# Patient Record
Sex: MALE | Race: WHITE | Employment: FULL TIME | URBAN - METROPOLITAN AREA
[De-identification: names, ages, dates, MRNs, and addresses within clinical notes are randomized per-mention and may not be internally consistent; named-entity substitution may affect disease eponyms.]

---

## 2017-02-14 RX ORDER — BISOPROLOL FUMARATE 5 MG/1
TABLET ORAL
Qty: 30 TABLET | Refills: 0 | Status: SHIPPED | OUTPATIENT
Start: 2017-02-14 | End: 2017-09-11

## 2017-03-11 ENCOUNTER — TELEPHONE (OUTPATIENT)
Dept: INTERNAL MEDICINE CLINIC | Facility: CLINIC | Age: 52
End: 2017-03-11

## 2017-03-11 NOTE — TELEPHONE ENCOUNTER
KIRKI-sent pt to [facility], pt agreeable to plan and to also RF on Tues or call for referral to specialist if recommended (says RF is his new PCP)  Reason for Call/Chief Complaint: rash on inside of eyelid, eye pain  Onset: 2 months  Nursing Assessment/Associated

## 2017-03-13 ENCOUNTER — OFFICE VISIT (OUTPATIENT)
Dept: FAMILY MEDICINE CLINIC | Facility: CLINIC | Age: 52
End: 2017-03-13

## 2017-03-13 VITALS
TEMPERATURE: 98 F | WEIGHT: 230 LBS | SYSTOLIC BLOOD PRESSURE: 128 MMHG | HEIGHT: 72 IN | HEART RATE: 70 BPM | DIASTOLIC BLOOD PRESSURE: 90 MMHG | OXYGEN SATURATION: 98 % | RESPIRATION RATE: 20 BRPM | BODY MASS INDEX: 31.15 KG/M2

## 2017-03-13 DIAGNOSIS — H01.001 BLEPHARITIS OF RIGHT UPPER EYELID, UNSPECIFIED TYPE: Primary | ICD-10-CM

## 2017-03-13 PROCEDURE — 99203 OFFICE O/P NEW LOW 30 MIN: CPT | Performed by: NURSE PRACTITIONER

## 2017-03-13 RX ORDER — ERYTHROMYCIN 5 MG/G
OINTMENT OPHTHALMIC
Qty: 3.5 G | Refills: 0 | Status: SHIPPED | OUTPATIENT
Start: 2017-03-13 | End: 2017-03-27

## 2017-03-13 NOTE — TELEPHONE ENCOUNTER
Please see below. Patient scheduled an appointment on 3/14/17 at 12:00 pm with Dr. Dinah Bryant. Unable to check how patient is doing for his mailbox if full and unable to leave a message.    Ok to wait until tomorrow?:

## 2017-03-13 NOTE — PATIENT INSTRUCTIONS
Blepharitis    Blepharitis is an inflammation of the eyelid. It results in swelling of the eyelids, and it is usually caused by a bacterial infection or a skin condition. Blepharitis is a common eye condition. There are two types.  Anterior blepharitis oc 2. Apply a warm compress or a warm, moist washcloth to the eyelids for 1 minute, 2 to 3 times a day, to loosen the crust. Then, wipe away scales or crust from the eyelids.   3. After applying the warm compress, gently scrub the base of the eyelashes for brad © 1675-2683 [address], [address] [place]. All rights reserved. This information is not intended as a substitute for professional medical care. Always follow your healthcare professional's instructions.         Chen

## 2017-03-13 NOTE — PROGRESS NOTES
Camila Gambino is a 45year old male.   CHIEF COMPLAINT:   Patient presents with:  Eye Problem: corner right eye red and irritated x 2 months        HPI:   Camila Gambino is a 45year old male who presents with a222 2 month history of erythema, irritation fluticasone-salmeterol (ADVAIR DISKUS) 250-50 MCG/DOSE Inhalation Aerosol Powder, Breath Activated Inhale 1 puff into the lungs every 12 (twelve) hours.  Disp: 2 Package Rfl: 6      Past Medical History   Diagnosis Date   • Unspecified essential hypertensio CARDIO: RRR without murmur  LYMPH: no preauricular lymphadenopathy.  No cervical lymphadenopathy    ASSESSMENT AND PLAN:   Sherley Farmer is a 45year old male who presents with Patient presents with:  Eye Problem: corner right eye red and irritated x 2 mo 3. Acne rosacea (a skin condition that causes redness of the face, and other symptoms)  4. Eyelash mites (tiny organisms in the eyelash follicles)  5. Allergic reactions to cosmetics or medicines  Home care  Medicine:  The healthcare provider may prescribe Call your healthcare provider right away if any of these occur:  · Increase in redness of the white part of the eye  · Increase in swelling, redness, irritation, or pain of the eyelids  · Eye pain  · Change in vision (trouble seeing or blurring)  · Drainag xxxxxxxxxxxxxxxxxxxxxxxxxxxxxxxxxxxxxxxxxxxxxxxxxxxxxxxxxxxxxxxxxxxxxxxxxxxxxxxxxxxxxxxxxxxxxxxxxxxxxxxxxxxxxxxxxxxxxxxxxxxxxxxxxxxxx    Follow up with your PCP if there is no improvement in 1 week

## 2017-03-24 ENCOUNTER — TELEPHONE (OUTPATIENT)
Dept: FAMILY MEDICINE CLINIC | Facility: CLINIC | Age: 52
End: 2017-03-24

## 2017-03-24 NOTE — TELEPHONE ENCOUNTER
Pt calling to request refill on erythromycin op oint that he was prescribed 2 weeks ago for blepharitis. I did not see him for this encounter on 3/13 and he is unfamiliar to me. Reports his symptoms have improved but are not completely resolved.  Nothing n

## 2017-03-27 ENCOUNTER — HOSPITAL ENCOUNTER (OUTPATIENT)
Dept: ULTRASOUND IMAGING | Facility: HOSPITAL | Age: 52
Discharge: HOME | End: 2017-03-27
Attending: INTERNAL MEDICINE
Payer: COMMERCIAL

## 2017-03-27 ENCOUNTER — OFFICE VISIT (OUTPATIENT)
Dept: INTERNAL MEDICINE CLINIC | Facility: CLINIC | Age: 52
End: 2017-03-27

## 2017-03-27 VITALS
RESPIRATION RATE: 20 BRPM | SYSTOLIC BLOOD PRESSURE: 138 MMHG | TEMPERATURE: 98 F | BODY MASS INDEX: 31.42 KG/M2 | DIASTOLIC BLOOD PRESSURE: 80 MMHG | HEIGHT: 72 IN | HEART RATE: 72 BPM | WEIGHT: 232 LBS

## 2017-03-27 DIAGNOSIS — Z12.5 SCREENING PSA (PROSTATE SPECIFIC ANTIGEN): ICD-10-CM

## 2017-03-27 DIAGNOSIS — M79.662 PAIN AND SWELLING OF LEFT LOWER LEG: ICD-10-CM

## 2017-03-27 DIAGNOSIS — K92.1 BLOOD IN STOOL: ICD-10-CM

## 2017-03-27 DIAGNOSIS — M79.89 PAIN AND SWELLING OF LEFT LOWER LEG: ICD-10-CM

## 2017-03-27 DIAGNOSIS — H01.9 INFECTED EYE LID: Primary | ICD-10-CM

## 2017-03-27 DIAGNOSIS — Z98.890 HISTORY OF COLONOSCOPY: ICD-10-CM

## 2017-03-27 DIAGNOSIS — Z86.718 HISTORY OF DVT IN ADULTHOOD: ICD-10-CM

## 2017-03-27 DIAGNOSIS — I10 ESSENTIAL HYPERTENSION: ICD-10-CM

## 2017-03-27 DIAGNOSIS — Z12.11 SCREEN FOR COLON CANCER: ICD-10-CM

## 2017-03-27 PROCEDURE — 93971 EXTREMITY STUDY: CPT

## 2017-03-27 PROCEDURE — 99214 OFFICE O/P EST MOD 30 MIN: CPT | Performed by: INTERNAL MEDICINE

## 2017-03-27 PROCEDURE — 99212 OFFICE O/P EST SF 10 MIN: CPT | Performed by: INTERNAL MEDICINE

## 2017-03-27 RX ORDER — ZOLPIDEM TARTRATE 5 MG/1
5 TABLET ORAL NIGHTLY PRN
Refills: 1 | COMMUNITY
Start: 2017-01-10 | End: 2019-08-20

## 2017-03-27 RX ORDER — CLINDAMYCIN HYDROCHLORIDE 300 MG/1
300 CAPSULE ORAL 3 TIMES DAILY
Qty: 30 CAPSULE | Refills: 0 | Status: SHIPPED | OUTPATIENT
Start: 2017-03-27 | End: 2017-03-31

## 2017-03-27 RX ORDER — DEXTROAMPHETAMINE SACCHARATE, AMPHETAMINE ASPARTATE, DEXTROAMPHETAMINE SULFATE AND AMPHETAMINE SULFATE 2.5; 2.5; 2.5; 2.5 MG/1; MG/1; MG/1; MG/1
10 TABLET ORAL DAILY
Refills: 0 | Status: ON HOLD | COMMUNITY
Start: 2017-02-15 | End: 2017-09-04

## 2017-03-27 RX ORDER — SERTRALINE HYDROCHLORIDE 100 MG/1
100 TABLET, FILM COATED ORAL DAILY
Refills: 1 | COMMUNITY
Start: 2017-01-10 | End: 2017-03-27

## 2017-03-27 NOTE — PROGRESS NOTES
HPI:    Patient ID: Julia Menjivar is a 45year old male. Eye Problem   The right eye is affected. This is a recurrent problem. The current episode started in the past 7 days. The problem occurs intermittently. The pain is mild.  Associated symptoms inc HENT:   Head: Normocephalic and atraumatic.    Right Ear: Tympanic membrane, external ear and ear canal normal.   Left Ear: Tympanic membrane, external ear and ear canal normal.   Nose: Nose normal. Right sinus exhibits no maxillary sinus tenderness and no Take high blood pressure medication as perscribed   Low- sodium diet (2grams per day)   Maintain a low fat diet   Maintain ideal weight   Regular walking/exercise as tolerated   Track and record blood pressure at home   The risks and benefits of treatment

## 2017-03-28 ENCOUNTER — TELEPHONE (OUTPATIENT)
Dept: INTERNAL MEDICINE CLINIC | Facility: CLINIC | Age: 52
End: 2017-03-28

## 2017-03-28 ENCOUNTER — TELEPHONE (OUTPATIENT)
Dept: OPHTHALMOLOGY | Facility: CLINIC | Age: 52
End: 2017-03-28

## 2017-03-28 NOTE — TELEPHONE ENCOUNTER
Pt stts he has acid reflux. Pt asking if symptom is an side effect from taking Clindamycin HCl 300 MG Oral Cap ?

## 2017-03-28 NOTE — TELEPHONE ENCOUNTER
Patient  To  Take  Medicine  With  Some food  - stay  Upright  After  Taking  medication  At  Least  For  1  Hr  - do not take  It  Just  Before   Sleep time  .     Can  Take prilosec  OTc   In AM   20 mg 1  Hr  Before  Meal in AM -  And  At   Bed time   -

## 2017-03-28 NOTE — TELEPHONE ENCOUNTER
Received referral from fax Re: eye infection per PCP Dr. Garrett Manley. I called pt but unable to LM due to mailbox being full. Referral scanned in pts chart. OK to book office visit with Ophthalmology if pt calls.

## 2017-03-28 NOTE — TELEPHONE ENCOUNTER
Spoke with patient and relayed doctor message. Verbalized understanding. Advised to call back or go straight to ER if s/sx worsen, questions or concerns. Patient verbalized understanding and agrees with plan.

## 2017-03-31 ENCOUNTER — OFFICE VISIT (OUTPATIENT)
Dept: INTERNAL MEDICINE CLINIC | Facility: CLINIC | Age: 52
End: 2017-03-31

## 2017-03-31 VITALS
BODY MASS INDEX: 31.61 KG/M2 | DIASTOLIC BLOOD PRESSURE: 88 MMHG | HEIGHT: 72 IN | TEMPERATURE: 98 F | SYSTOLIC BLOOD PRESSURE: 133 MMHG | WEIGHT: 233.38 LBS | HEART RATE: 69 BPM

## 2017-03-31 DIAGNOSIS — L20.82 FLEXURAL ECZEMA: Primary | ICD-10-CM

## 2017-03-31 PROCEDURE — 99212 OFFICE O/P EST SF 10 MIN: CPT | Performed by: INTERNAL MEDICINE

## 2017-03-31 PROCEDURE — 99213 OFFICE O/P EST LOW 20 MIN: CPT | Performed by: INTERNAL MEDICINE

## 2017-03-31 RX ORDER — MUPIROCIN CALCIUM 20 MG/G
1 CREAM TOPICAL DAILY
Qty: 15 G | Refills: 1 | Status: SHIPPED | OUTPATIENT
Start: 2017-03-31 | End: 2017-04-14

## 2017-03-31 NOTE — PROGRESS NOTES
Sees multiple docs  Was given clindamycin by dr Kash Reynolds for lesion described in her note  Got diarrhea  Applying neosporin  Blood pressure 133/88, pulse 69, temperature 98 °F (36.7 °C), temperature source Oral, height 6' (1.829 m), weight 233 lb 6.4 oz (105. 8

## 2017-04-21 ENCOUNTER — TELEPHONE (OUTPATIENT)
Dept: INTERNAL MEDICINE CLINIC | Facility: CLINIC | Age: 52
End: 2017-04-21

## 2017-04-21 NOTE — TELEPHONE ENCOUNTER
Advised patient of Dr. Usama Hackett note. Patient verbalized understanding. Appointment made for tomorrow at 10:10am with Dr Marissa Dhillon at [facility].

## 2017-04-21 NOTE — TELEPHONE ENCOUNTER
Ongoing right eye issue since November, eye is red with hazy vision  Referral to ophthalmology is being requesting  Has post nasal drip improving today. Will gargle with salt water and push fluids.   To call back for F/U appt

## 2017-04-21 NOTE — TELEPHONE ENCOUNTER
Please advise patient to be seen as soon as possible below to immediate care or any available Dr. in the clinic tomorrow    Patient was provided with referrals to see the eye doctor on 3/27    -patient likely has infection and needs to be seen  by the doct

## 2017-04-21 NOTE — TELEPHONE ENCOUNTER
Pt states has been having issue with right eye, pt states red, thought it was eye infection seemed like it was going away but then comes back, pt states medication is not helping. Pt also states his glands are swollen, Requesting advise.

## 2017-04-22 ENCOUNTER — OFFICE VISIT (OUTPATIENT)
Dept: INTERNAL MEDICINE CLINIC | Facility: CLINIC | Age: 52
End: 2017-04-22

## 2017-04-22 VITALS
TEMPERATURE: 98 F | HEART RATE: 70 BPM | BODY MASS INDEX: 31 KG/M2 | SYSTOLIC BLOOD PRESSURE: 141 MMHG | WEIGHT: 232 LBS | DIASTOLIC BLOOD PRESSURE: 97 MMHG

## 2017-04-22 DIAGNOSIS — H01.113: Primary | ICD-10-CM

## 2017-04-22 PROCEDURE — 99212 OFFICE O/P EST SF 10 MIN: CPT | Performed by: INTERNAL MEDICINE

## 2017-04-22 PROCEDURE — 99213 OFFICE O/P EST LOW 20 MIN: CPT | Performed by: INTERNAL MEDICINE

## 2017-04-22 RX ORDER — MUPIROCIN CALCIUM 20 MG/G
CREAM TOPICAL
Refills: 1 | COMMUNITY
Start: 2017-04-18 | End: 2017-09-11

## 2017-04-22 NOTE — PROGRESS NOTES
Not improving eye  C/o jaw pain  Blood pressure 141/97, pulse 70, temperature 97.9 °F (36.6 °C), temperature source Oral, weight 232 lb (105.235 kg). r eye crust around eye no stye no cellulitis  Jaw-no nodes enlarged saliva glands  1.  Contact allergy eye

## 2017-05-26 ENCOUNTER — HOSPITAL (OUTPATIENT)
Dept: OTHER | Age: 52
End: 2017-05-26
Attending: EMERGENCY MEDICINE

## 2017-05-31 ENCOUNTER — TELEPHONE (OUTPATIENT)
Dept: INTERNAL MEDICINE CLINIC | Facility: CLINIC | Age: 52
End: 2017-05-31

## 2017-07-05 ENCOUNTER — TELEPHONE (OUTPATIENT)
Dept: INTERNAL MEDICINE CLINIC | Facility: CLINIC | Age: 52
End: 2017-07-05

## 2017-07-05 NOTE — TELEPHONE ENCOUNTER
Pt is due for colonoscopy. I called patient to assist in scheduling gastro consult. Unable to leave voicemail message. Mailbox full.

## 2017-07-26 ENCOUNTER — TELEPHONE (OUTPATIENT)
Dept: INTERNAL MEDICINE CLINIC | Facility: CLINIC | Age: 52
End: 2017-07-26

## 2017-09-04 PROBLEM — F29 PSYCHOSIS (HCC): Status: ACTIVE | Noted: 2017-09-04

## 2017-09-05 PROBLEM — Z86.718 HISTORY OF BLOOD CLOTS: Status: ACTIVE | Noted: 2017-09-05

## 2017-09-05 PROBLEM — E66.9 OBESITY: Status: ACTIVE | Noted: 2017-09-05

## 2017-09-05 PROBLEM — F98.8 ADD (ATTENTION DEFICIT DISORDER): Status: ACTIVE | Noted: 2017-09-05

## 2017-09-05 PROBLEM — E78.1 HIGH TRIGLYCERIDES: Status: ACTIVE | Noted: 2017-09-05

## 2017-09-05 PROBLEM — K21.9 ESOPHAGEAL REFLUX: Status: ACTIVE | Noted: 2017-09-05

## 2017-09-11 PROBLEM — F29 PSYCHOSIS (HCC): Status: RESOLVED | Noted: 2017-09-04 | Resolved: 2017-09-11

## 2017-09-11 PROBLEM — F42.9 OCD (OBSESSIVE COMPULSIVE DISORDER): Status: ACTIVE | Noted: 2017-09-11

## 2017-09-12 ENCOUNTER — TELEPHONE (OUTPATIENT)
Dept: INTERNAL MEDICINE CLINIC | Facility: CLINIC | Age: 52
End: 2017-09-12

## 2017-09-12 RX ORDER — NEBIVOLOL 10 MG/1
10 TABLET ORAL DAILY
Qty: 30 TABLET | Refills: 2 | Status: SHIPPED | OUTPATIENT
Start: 2017-09-12 | End: 2017-09-13

## 2017-09-12 NOTE — TELEPHONE ENCOUNTER
Patient's daughter called. patient needs nebivolol 10mg  And Out of medication. Unable to get response from psychiatrist at [facility], and there depart referred back to PCP. Chart reviewed. Refills sent per Triage Dept protocol.    Daughter advised to PHP/IOP Stepdown from Inpt          Lab Results  Component Value Date   GLU 81 09/05/2017   BUN 18 09/05/2017   CREATSERUM 1.01 09/05/2017   BUNCREA 13.4 08/29/2016   GFRNAA >60 08/29/2016   GFRAA >60 08/29/2016   CA 8.7 09/05/2017   ALKPHOS 44 08/29/2016

## 2017-09-12 NOTE — TELEPHONE ENCOUNTER
[address] [place] [address]       Current Outpatient Prescriptions:  Nebivolol HCl 10 MG Oral Tab Take 1 tablet (10 mg total) by mouth Daily Beta Blocker.  Disp: 30 tablet Rfl: 0   hydrocortisone 2.5 % External Cream Apply 1 Application topically 2 (two) t

## 2017-09-13 ENCOUNTER — TELEPHONE (OUTPATIENT)
Dept: OTHER | Age: 52
End: 2017-09-13

## 2017-09-13 RX ORDER — CARVEDILOL 6.25 MG/1
6.25 TABLET ORAL 2 TIMES DAILY
Qty: 60 TABLET | Refills: 0 | Status: SHIPPED | OUTPATIENT
Start: 2017-09-13 | End: 2018-02-20

## 2017-09-13 RX ORDER — NEBIVOLOL 10 MG/1
10 TABLET ORAL DAILY
Qty: 30 TABLET | Refills: 2 | Status: SHIPPED | OUTPATIENT
Start: 2017-09-13 | End: 2017-09-19

## 2017-09-13 RX ORDER — RISPERIDONE 0.5 MG/1
0.5 TABLET, FILM COATED ORAL 2 TIMES DAILY
Qty: 60 TABLET | Refills: 0 | Status: CANCELLED | OUTPATIENT
Start: 2017-09-13

## 2017-09-13 NOTE — TELEPHONE ENCOUNTER
Pharmacy called in stating pt's medication for Nebivolol is not covered by insurance and is requesting if another medication can be prescribed, please advise.

## 2017-09-13 NOTE — TELEPHONE ENCOUNTER
Pt called office and asking if nebivolol can be escribed to [facility]. Med escribed. Pt states he lost his risperidone bottle he was rx on 9/11/17. Asking for refill. Will seek MD recommendations.     Pt made OV for B/P check 9/19/17

## 2017-09-14 RX ORDER — RISPERIDONE 0.5 MG/1
0.5 TABLET, FILM COATED ORAL 2 TIMES DAILY
Qty: 60 TABLET | Refills: 0 | Status: CANCELLED | OUTPATIENT
Start: 2017-09-14

## 2017-09-14 NOTE — TELEPHONE ENCOUNTER
Spoke with patient and relayed EV message below-he states he will  Coreg and keep appointment 9/19/17 at 3 p.m. Patient asking for refill on Risperdone as he lost bottle he picked up 9/11/17.

## 2017-09-19 ENCOUNTER — OFFICE VISIT (OUTPATIENT)
Dept: INTERNAL MEDICINE CLINIC | Facility: CLINIC | Age: 52
End: 2017-09-19

## 2017-09-19 VITALS
SYSTOLIC BLOOD PRESSURE: 135 MMHG | RESPIRATION RATE: 20 BRPM | HEART RATE: 68 BPM | DIASTOLIC BLOOD PRESSURE: 78 MMHG | TEMPERATURE: 98 F | BODY MASS INDEX: 31.97 KG/M2 | WEIGHT: 236 LBS | HEIGHT: 72 IN

## 2017-09-19 DIAGNOSIS — F32.A ANXIETY AND DEPRESSION: ICD-10-CM

## 2017-09-19 DIAGNOSIS — E78.5 HYPERLIPIDEMIA, UNSPECIFIED HYPERLIPIDEMIA TYPE: ICD-10-CM

## 2017-09-19 DIAGNOSIS — I10 ESSENTIAL HYPERTENSION: Primary | ICD-10-CM

## 2017-09-19 DIAGNOSIS — F41.9 ANXIETY AND DEPRESSION: ICD-10-CM

## 2017-09-19 PROCEDURE — 99212 OFFICE O/P EST SF 10 MIN: CPT | Performed by: INTERNAL MEDICINE

## 2017-09-19 PROCEDURE — 99214 OFFICE O/P EST MOD 30 MIN: CPT | Performed by: INTERNAL MEDICINE

## 2017-09-19 RX ORDER — ZOLPIDEM TARTRATE 5 MG/1
5 TABLET ORAL NIGHTLY PRN
Qty: 30 TABLET | Refills: 0 | Status: CANCELLED | OUTPATIENT
Start: 2017-09-19

## 2017-09-19 NOTE — PROGRESS NOTES
HPI:    Patient ID: Margie Ruiz is a 45year old male. Hypertension   This is a chronic problem. The current episode started more than 1 year ago. The problem has been gradually improving since onset. Associated symptoms include anxiety.  Pertinent n Tartrate 5 MG Oral Tab Take 5 mg by mouth nightly as needed. Disp:  Rfl: 1     Allergies:Not on File   PHYSICAL EXAM:   Physical Exam   Constitutional: He is oriented to person, place, and time. He appears well-developed and well-nourished. No distress. hypertension  (primary encounter diagnosis)  Take high blood pressure medication as perscribed   Low- sodium diet (2grams per day)   Maintain a low fat diet   Maintain ideal weight   Regular walking/exercise as tolerated   Track and record blood pressure a

## 2018-02-12 ENCOUNTER — TELEPHONE (OUTPATIENT)
Dept: INTERNAL MEDICINE CLINIC | Facility: CLINIC | Age: 53
End: 2018-02-12

## 2018-02-12 NOTE — TELEPHONE ENCOUNTER
Patient's brother Harman Macias calling (not on HIPAA, only receiving info from brother) Brother states for approx the last year, he believes the pt's behavior has been becoming increasingly erratic.  Brother states he has seen evidence of the pt having obsessive/co

## 2018-02-12 NOTE — TELEPHONE ENCOUNTER
ADVICE  PATIENT   AND  BROTHER  TO DISCUSS  AND TO  COME TOGETHER  FOR APPOINTMENT WITH PSYCHIATRIST AND MYSELF TO ADDRESS  ALL  THIS  CONCERNS  AND SYMPTOMS

## 2018-02-13 NOTE — TELEPHONE ENCOUNTER
Spoke to pt's father who called back. He is very distressed about the pt's behavior. The pt is not following through with appointments and is not taking meds as prescribed. Informed him that  advises family member attending appts with EV and with Psych.

## 2018-02-13 NOTE — TELEPHONE ENCOUNTER
I called father as he is listed on [employer] and he stated that he will call us back tomorrow.   May release ROUTINE Info to :   Name:  Vik Fu    Relationship:   FATHER   Phone number:  [phone]

## 2018-02-19 ENCOUNTER — TELEPHONE (OUTPATIENT)
Dept: OTHER | Age: 53
End: 2018-02-19

## 2018-02-19 NOTE — TELEPHONE ENCOUNTER
Davis Enrique (father) called to change pts' appt for earlier appt because he works 3p to 11pm and can't miss work. Rescheduled appt 2/20/18 @ 11:00am [place] office with Dr Amie Jensen. Informed father to get PHI signed at office visit.

## 2018-02-20 PROBLEM — F41.8 DEPRESSION WITH ANXIETY: Status: ACTIVE | Noted: 2017-09-19

## 2018-02-20 NOTE — PROGRESS NOTES
HPI:    Patient ID: Julia Menjivar is a 52year old male.     Depression   Visit Type: follow-up (pt  stat e feels  better   now -  seein  in [address] )  Patient presents with the following symptoms: decreased concentration, depressed mood, fe Normocephalic and atraumatic.    Right Ear: Tympanic membrane, external ear and ear canal normal.   Left Ear: Tympanic membrane, external ear and ear canal normal.   Nose: Nose normal. Right sinus exhibits no maxillary sinus tenderness and no frontal sinus Judgment , spending money and  being suspicious  toward  Family  - per  Family   I  Advised pt and   Parents  To see Dr Naif Carlin all his  Behavior that is present  For    Many years  But is getting worse  Lately   Refe

## 2018-02-26 ENCOUNTER — OFFICE VISIT (OUTPATIENT)
Dept: FAMILY MEDICINE CLINIC | Facility: CLINIC | Age: 53
End: 2018-02-26

## 2018-02-26 ENCOUNTER — NURSE TRIAGE (OUTPATIENT)
Dept: INTERNAL MEDICINE CLINIC | Facility: CLINIC | Age: 53
End: 2018-02-26

## 2018-02-26 VITALS
TEMPERATURE: 98 F | SYSTOLIC BLOOD PRESSURE: 147 MMHG | BODY MASS INDEX: 31 KG/M2 | HEART RATE: 82 BPM | DIASTOLIC BLOOD PRESSURE: 103 MMHG | WEIGHT: 230 LBS

## 2018-02-26 DIAGNOSIS — J02.9 SORE THROAT: ICD-10-CM

## 2018-02-26 DIAGNOSIS — H00.014 HORDEOLUM EXTERNUM OF LEFT UPPER EYELID: Primary | ICD-10-CM

## 2018-02-26 DIAGNOSIS — I10 ESSENTIAL HYPERTENSION: ICD-10-CM

## 2018-02-26 LAB
CONTROL LINE PRESENT WITH A CLEAR BACKGROUND (YES/NO): YES YES/NO
KIT LOT #: NORMAL NUMERIC
STREP GRP A CUL-SCR: NEGATIVE

## 2018-02-26 PROCEDURE — 99212 OFFICE O/P EST SF 10 MIN: CPT | Performed by: FAMILY MEDICINE

## 2018-02-26 PROCEDURE — 87880 STREP A ASSAY W/OPTIC: CPT | Performed by: FAMILY MEDICINE

## 2018-02-26 PROCEDURE — 99214 OFFICE O/P EST MOD 30 MIN: CPT | Performed by: FAMILY MEDICINE

## 2018-02-26 RX ORDER — CIPROFLOXACIN HYDROCHLORIDE 3.5 MG/ML
2 SOLUTION/ DROPS TOPICAL
Qty: 5 ML | Refills: 0 | Status: SHIPPED | OUTPATIENT
Start: 2018-02-26 | End: 2018-03-03

## 2018-02-26 RX ORDER — NEBIVOLOL 10 MG/1
10 TABLET ORAL DAILY
Qty: 90 TABLET | Refills: 1 | Status: SHIPPED | OUTPATIENT
Start: 2018-02-26 | End: 2018-03-07

## 2018-02-26 NOTE — PROGRESS NOTES
2018 4:20 PM    Tuan Lazcano, : 1965  Patient presents with:  Eye Pain: left eye lid swelling, itchiness, blurry vision,gritty sensation  Sore Throat    HPI:     Tuan Lazcano is a 52year old male who presents for evaluation of a chief co INTEGUMENTARY:  Negative for rash. ALLERGIC/IMMUNOLOGIC:  Negative for seasonal allergies, frequent URI-type illnesses and history of serious infectious illnesses. NEUROLOGICAL: negative for headaches.     Past Medical History:   Past Medical Histor (!) 147/103 (BP Location: Right arm, Patient Position: Sitting, Cuff Size: adult)   Pulse 82   Temp 98.1 °F (36.7 °C) (Oral)   Wt 230 lb (104.3 kg)   BMI 31.19 kg/m²     GENERAL: well developed, well nourished, well hydrated, no distress  SKIN: good skin t test(s) ordered today ;   Orders Placed This Encounter      POC Rapid Strep [[id number]]     RECOMMENDATIONS given include: Please, call our office with any questions or concerns.  Notify the doctor if there is a deterioration or worsening of the medical conditi

## 2018-02-26 NOTE — TELEPHONE ENCOUNTER
Action Requested: Summary for Provider     []  Critical Lab, Recommendations Needed  [x] Need Additional Advice  []   FYI    []   Need Orders  [] Need Medications Sent to Pharmacy  []  Other     SUMMARY: Dr Vinnie Willis patient reports 2 day onset redness of t

## 2018-02-27 ENCOUNTER — TELEPHONE (OUTPATIENT)
Dept: INTERNAL MEDICINE CLINIC | Facility: CLINIC | Age: 53
End: 2018-02-27

## 2018-02-28 NOTE — TELEPHONE ENCOUNTER
PA for Bystolic 10 mg tab completed with [employer] via CMM response time 3-5 business days KEY [id number].

## 2018-03-05 ENCOUNTER — TELEPHONE (OUTPATIENT)
Dept: INTERNAL MEDICINE CLINIC | Facility: CLINIC | Age: 53
End: 2018-03-05

## 2018-03-05 DIAGNOSIS — F41.9 ANXIETY: ICD-10-CM

## 2018-03-05 DIAGNOSIS — F32.A DEPRESSION, UNSPECIFIED DEPRESSION TYPE: Primary | ICD-10-CM

## 2018-03-05 RX ORDER — CIPROFLOXACIN HYDROCHLORIDE 3.5 MG/ML
SOLUTION/ DROPS TOPICAL
Qty: 5 ML | Refills: 0 | Status: SHIPPED | OUTPATIENT
Start: 2018-03-05 | End: 2018-03-22

## 2018-03-05 NOTE — TELEPHONE ENCOUNTER
Per dad of pt (in [employer]) would like to talk to EV in regards to pt OV with Dr Gopal Box last 03/01/2018, Pt did not want parents to come with him to his appt on that day. Dad stts that pt was in denial., no more info given. Pls advise.

## 2018-03-07 RX ORDER — METOPROLOL TARTRATE 50 MG/1
50 TABLET, FILM COATED ORAL
Qty: 90 TABLET | Refills: 0 | Status: SHIPPED | OUTPATIENT
Start: 2018-03-07 | End: 2018-07-11

## 2018-03-07 NOTE — TELEPHONE ENCOUNTER
PA denied. Plan states patient must have failed two alternative drugs and medical records such as chart notes showing patient has failed alternative drugs must be sent in. Alternative drugs are: atenolol, bisoprolol, and metoprolol.

## 2018-03-19 NOTE — TELEPHONE ENCOUNTER
Called     Started  Taking  Pt   Father  Danelle Adrianubbbas is with   Macy Vela  Now   And line   Got  disconnected .

## 2018-03-19 NOTE — TELEPHONE ENCOUNTER
Discussed with  Pt  And  Father  Advised  To go  For  Visits   Together 0   Dr Ba Rain - Dr Vandana Castano -   Also  f/u  With me    Abs and bp check -   Pt  And  Father   verbalized understanding   And compliance

## 2018-03-22 ENCOUNTER — NURSE TRIAGE (OUTPATIENT)
Dept: INTERNAL MEDICINE CLINIC | Facility: CLINIC | Age: 53
End: 2018-03-22

## 2018-03-22 ENCOUNTER — OFFICE VISIT (OUTPATIENT)
Dept: FAMILY MEDICINE CLINIC | Facility: CLINIC | Age: 53
End: 2018-03-22

## 2018-03-22 VITALS
OXYGEN SATURATION: 98 % | BODY MASS INDEX: 31.56 KG/M2 | TEMPERATURE: 98 F | HEART RATE: 90 BPM | WEIGHT: 233 LBS | RESPIRATION RATE: 16 BRPM | HEIGHT: 72 IN | SYSTOLIC BLOOD PRESSURE: 142 MMHG | DIASTOLIC BLOOD PRESSURE: 108 MMHG

## 2018-03-22 DIAGNOSIS — J20.8 ACUTE VIRAL BRONCHITIS: Primary | ICD-10-CM

## 2018-03-22 DIAGNOSIS — I10 ESSENTIAL HYPERTENSION: ICD-10-CM

## 2018-03-22 PROCEDURE — 99214 OFFICE O/P EST MOD 30 MIN: CPT | Performed by: PHYSICIAN ASSISTANT

## 2018-03-22 RX ORDER — ALBUTEROL SULFATE 90 UG/1
2 AEROSOL, METERED RESPIRATORY (INHALATION) EVERY 6 HOURS
Qty: 1 INHALER | Refills: 1 | Status: SHIPPED | OUTPATIENT
Start: 2018-03-22 | End: 2020-05-08

## 2018-03-22 NOTE — PROGRESS NOTES
CHIEF COMPLAINT:   Patient presents with:  Cough: +tactile fever, productive cough, wheezing?, burning in chest 5 days, slight sore throat        HPI:   Jaylene García is a 52year old male who presents for cough for  5  days.   Cough started gradually and CARDIOVASCULAR: Denies palpitations  LUNGS: Per HPI. GI: Denies N/V/C/D or abdominal pain.       EXAM:   BP (!) 142/108   Pulse 90   Temp 98.2 °F (36.8 °C)   Resp 16   Ht 72\"   Wt 233 lb   SpO2 98%   BMI 31.60 kg/m²   GENERAL: well developed, well nouris The patient is asked to see PCP if sx's persist for more than 2 weeks, sooner if worsen or new onset of fever. Patient Instructions     Viral Bronchitis (Adult)    You have a viral bronchitis.  Bronchitis is inflammation and swelling of the lining of the · Your appetite may be poor, so a light diet is fine. Avoid dehydration by drinking 6 to 8 glasses of fluids per day (such as water, soft drinks, sports drinks, juices, tea, or soup). Extra fluids will help loosen secretions in the nose and lungs.   · Over-

## 2018-03-22 NOTE — TELEPHONE ENCOUNTER
Action Requested: Summary for Provider     []  Critical Lab, Recommendations Needed  [] Need Additional Advice  []   FYI    []   Need Orders  [] Need Medications Sent to Pharmacy  []  Other     SUMMARY: patient directed to [facility] for productive cough,

## 2018-03-23 NOTE — TELEPHONE ENCOUNTER
Was seen in the UC. He needs a follow up appointment for hypertension. Patient did not  his medication given as yet for he gets paid today. He will call back to schedule his follow up appointment for his hypertension.     Now he has sinus conge

## 2018-03-24 NOTE — TELEPHONE ENCOUNTER
Pt was called and an appt was scheduled for Tuesday. Pt would prefer Monday. Dr. María Elena Pike, can pt be added. It was suggested to the pt to call Monday to see if there are cancellation. He voices understanding and agrees with plan.   Advised pt to rest, increase fl

## 2018-03-26 NOTE — TELEPHONE ENCOUNTER
No answer and unable to leave VM since mailbox is full. CSS, if/when pt calls back, please offer appt with EV today instead of the one tomorrow, as per EV's response below.

## 2018-03-27 ENCOUNTER — OFFICE VISIT (OUTPATIENT)
Dept: INTERNAL MEDICINE CLINIC | Facility: CLINIC | Age: 53
End: 2018-03-27

## 2018-03-27 VITALS
DIASTOLIC BLOOD PRESSURE: 97 MMHG | OXYGEN SATURATION: 95 % | RESPIRATION RATE: 18 BRPM | HEART RATE: 76 BPM | BODY MASS INDEX: 32 KG/M2 | SYSTOLIC BLOOD PRESSURE: 154 MMHG | WEIGHT: 236 LBS | TEMPERATURE: 98 F

## 2018-03-27 DIAGNOSIS — I10 ESSENTIAL HYPERTENSION: ICD-10-CM

## 2018-03-27 DIAGNOSIS — E78.5 HYPERLIPIDEMIA, UNSPECIFIED HYPERLIPIDEMIA TYPE: ICD-10-CM

## 2018-03-27 DIAGNOSIS — J01.90 ACUTE NON-RECURRENT SINUSITIS, UNSPECIFIED LOCATION: Primary | ICD-10-CM

## 2018-03-27 PROCEDURE — 99214 OFFICE O/P EST MOD 30 MIN: CPT | Performed by: INTERNAL MEDICINE

## 2018-03-27 PROCEDURE — 99212 OFFICE O/P EST SF 10 MIN: CPT | Performed by: INTERNAL MEDICINE

## 2018-03-27 RX ORDER — AZITHROMYCIN 250 MG/1
TABLET, FILM COATED ORAL
Qty: 6 TABLET | Refills: 0 | Status: SHIPPED | OUTPATIENT
Start: 2018-03-27 | End: 2018-03-31

## 2018-03-28 NOTE — PROGRESS NOTES
HPI:    Patient ID: Valentine Lucero is a 52year old male.   Presents for follow-up on the cough, hypertension    HPI   Patient reports that he has been coughing for about 2 weeks, he was seen at [facility] and prescribed albuterol inhaler, he st by mouth once daily. Disp: 90 tablet Rfl: 0   amphetamine-dextroamphetamine 10 MG Oral Tab Take 10 mg by mouth daily. Disp:  Rfl: 0   Zolpidem Tartrate 5 MG Oral Tab Take 5 mg by mouth nightly as needed.    Disp:  Rfl: 1     Allergies:No Known Allergies with PCP to 3 weeks for blood pressure check    No orders of the defined types were placed in this encounter.       Meds This Visit:  Signed Prescriptions Disp Refills    azithromycin 250 MG Oral Tab 6 tablet 0      Sig: Take 2 tablets by mouth on day #1, c

## 2018-05-18 ENCOUNTER — TELEPHONE (OUTPATIENT)
Dept: CASE MANAGEMENT | Age: 53
End: 2018-05-18

## 2018-05-20 ENCOUNTER — PATIENT OUTREACH (OUTPATIENT)
Dept: CASE MANAGEMENT | Age: 53
End: 2018-05-20

## 2018-05-20 NOTE — PROGRESS NOTES
Name: Toya Jensen       : 1965   Gender: male   Employer Group:   None     Insurance Information:        Medical Group Name: [facility]   Insurance Type: [employer]   Member Telephone:      Telephone Information:   Kadie Woo otherwise. He is still frustrated with work and his coworkers but he has taken a positive step recently and applied for another job. He has not been interviewed yet but he believes it is a better fit than what he is currently doing.  Pt still complains abou

## 2018-06-20 ENCOUNTER — PATIENT OUTREACH (OUTPATIENT)
Dept: CASE MANAGEMENT | Age: 53
End: 2018-06-20

## 2018-06-20 NOTE — PROGRESS NOTES
Patient is due for hypertension follow up appointment. Pt notified and verbalized understanding. 7/10/18 appt scheduled.

## 2018-07-11 ENCOUNTER — OFFICE VISIT (OUTPATIENT)
Dept: INTERNAL MEDICINE CLINIC | Facility: CLINIC | Age: 53
End: 2018-07-11

## 2018-07-11 VITALS
HEART RATE: 76 BPM | HEIGHT: 72 IN | TEMPERATURE: 98 F | BODY MASS INDEX: 31.67 KG/M2 | SYSTOLIC BLOOD PRESSURE: 150 MMHG | WEIGHT: 233.81 LBS | DIASTOLIC BLOOD PRESSURE: 86 MMHG | RESPIRATION RATE: 20 BRPM

## 2018-07-11 DIAGNOSIS — I10 ESSENTIAL HYPERTENSION: Primary | ICD-10-CM

## 2018-07-11 PROCEDURE — 99214 OFFICE O/P EST MOD 30 MIN: CPT | Performed by: INTERNAL MEDICINE

## 2018-07-11 PROCEDURE — 99212 OFFICE O/P EST SF 10 MIN: CPT | Performed by: INTERNAL MEDICINE

## 2018-07-11 RX ORDER — CARVEDILOL 6.25 MG/1
6.25 TABLET ORAL 2 TIMES DAILY WITH MEALS
Qty: 60 TABLET | Refills: 1 | Status: SHIPPED | OUTPATIENT
Start: 2018-07-11 | End: 2019-05-13

## 2018-07-11 RX ORDER — CARVEDILOL 6.25 MG/1
6.25 TABLET ORAL 2 TIMES DAILY WITH MEALS
COMMUNITY
End: 2019-03-04

## 2018-07-11 NOTE — PROGRESS NOTES
HPI:    Patient ID: Guido Alberto is a 52year old male. Patient in office today for follow up  bp check up  visit and exam. States feeling better and working .  Denies chest pain, shortnesss of breath, palpitations, or abdominal pain, denies Uti symptoms Aero Soln Inhale 2 puffs into the lungs every 6 (six) hours. Disp: 1 Inhaler Rfl: 1   amphetamine-dextroamphetamine 10 MG Oral Tab Take 10 mg by mouth daily. Disp:  Rfl: 0   Zolpidem Tartrate 5 MG Oral Tab Take 5 mg by mouth nightly as needed.    Disp:  R Decisions with other people . Nursing note and vitals reviewed. Blood pressure 150/86, pulse 76, temperature 98.4 °F (36.9 °C), temperature source Oral, resp. rate 20, height 6' (1.829 m), weight 233 lb 12.8 oz (106.1 kg).            ASSESSMENT/PLAN:

## 2018-08-27 ENCOUNTER — PATIENT OUTREACH (OUTPATIENT)
Dept: CASE MANAGEMENT | Age: 53
End: 2018-08-27

## 2018-08-27 NOTE — PROGRESS NOTES
Outreach to patient in regards to scheduling his HTN F/U appt. Left message to call back ext. [phone]. Thank you.

## 2018-09-06 ENCOUNTER — TELEPHONE (OUTPATIENT)
Dept: INTERNAL MEDICINE CLINIC | Facility: CLINIC | Age: 53
End: 2018-09-06

## 2018-09-06 NOTE — TELEPHONE ENCOUNTER
Pt called in to have referral for neurology-Alejandra Mckinney mailed to his home. Pt states that he had lost his referral and has not made an appt as of yet. Please call back with confirmation once referral is in the mail.

## 2018-09-12 NOTE — PROGRESS NOTES
Patient due for his HTN f/u visit with his PCP. After multiple attempts to reach patient by phone a letter was sent to patient requesting a call back to discuss.

## 2018-10-11 ENCOUNTER — TELEPHONE (OUTPATIENT)
Dept: INTERNAL MEDICINE CLINIC | Facility: CLINIC | Age: 53
End: 2018-10-11

## 2018-10-11 ENCOUNTER — MOBILE ENCOUNTER (OUTPATIENT)
Dept: INTERNAL MEDICINE CLINIC | Facility: CLINIC | Age: 53
End: 2018-10-11

## 2018-10-11 RX ORDER — AMOXICILLIN 500 MG/1
500 CAPSULE ORAL 3 TIMES DAILY
Qty: 30 CAPSULE | Refills: 0 | Status: SHIPPED | OUTPATIENT
Start: 2018-10-11 | End: 2018-10-21

## 2018-10-11 NOTE — TELEPHONE ENCOUNTER
Message # [id number]         10/11/2018 12:37a   [SERE[facility]]  To:  From:  GILBERT Richard MD:  Phone#:  ----------------------------------------------------------------------   [id number] [id number] [birth year] RE: PAIN IN MOUTH  Paged at number :  PAGE: [phone]

## 2018-12-06 ENCOUNTER — NURSE TRIAGE (OUTPATIENT)
Dept: OTHER | Age: 53
End: 2018-12-06

## 2018-12-06 NOTE — TELEPHONE ENCOUNTER
Action Requested: Summary for Provider     []  Critical Lab, Recommendations Needed  [] Need Additional Advice  [x]   FYI    []   Need Orders  [] Need Medications Sent to Pharmacy  []  Other     SUMMARY: Pt c/o persistent dry cough since Monday 3rd.  Felt w

## 2018-12-19 ENCOUNTER — TELEPHONE (OUTPATIENT)
Dept: INTERNAL MEDICINE CLINIC | Facility: CLINIC | Age: 53
End: 2018-12-19

## 2018-12-20 NOTE — TELEPHONE ENCOUNTER
Pt c/o cough for 2 weeks. He has sputum. Mild SOB. Advised appointment. He wants to be seen on his break from work. Advised he might need to go to urgent care. He didn't want to spend $100 for that.   He would like a regular appointment, but he says h

## 2019-01-07 ENCOUNTER — OFFICE VISIT (OUTPATIENT)
Dept: INTERNAL MEDICINE CLINIC | Facility: CLINIC | Age: 54
End: 2019-01-07

## 2019-01-07 ENCOUNTER — TELEPHONE (OUTPATIENT)
Dept: INTERNAL MEDICINE CLINIC | Facility: CLINIC | Age: 54
End: 2019-01-07

## 2019-01-07 VITALS
HEART RATE: 85 BPM | TEMPERATURE: 98 F | DIASTOLIC BLOOD PRESSURE: 97 MMHG | WEIGHT: 233.38 LBS | BODY MASS INDEX: 32 KG/M2 | SYSTOLIC BLOOD PRESSURE: 142 MMHG

## 2019-01-07 DIAGNOSIS — R53.83 FATIGUE, UNSPECIFIED TYPE: ICD-10-CM

## 2019-01-07 DIAGNOSIS — H53.9 CHANGE IN VISION: ICD-10-CM

## 2019-01-07 DIAGNOSIS — J40 BRONCHITIS: Primary | ICD-10-CM

## 2019-01-07 PROCEDURE — 99214 OFFICE O/P EST MOD 30 MIN: CPT | Performed by: INTERNAL MEDICINE

## 2019-01-07 PROCEDURE — 99212 OFFICE O/P EST SF 10 MIN: CPT | Performed by: INTERNAL MEDICINE

## 2019-01-07 RX ORDER — CODEINE PHOSPHATE AND GUAIFENESIN 10; 100 MG/5ML; MG/5ML
5 SOLUTION ORAL EVERY 6 HOURS PRN
Qty: 240 ML | Refills: 0 | Status: SHIPPED | OUTPATIENT
Start: 2019-01-07 | End: 2019-03-04 | Stop reason: ALTCHOICE

## 2019-01-07 NOTE — TELEPHONE ENCOUNTER
Message # [id number]         2019 01:53p   [[facility]]  To:  From:  GILBERT Richard MD:  Phone#:  ----------------------------------------------------------------------  Sean Young [phone] RE; PT HAS SINUS INFECTION, COUGHING,    61  Paged at n

## 2019-01-07 NOTE — PROGRESS NOTES
HPI:    Patient ID: Kahlil Escobedo is a 52year old male. Patient presents with:  Cough: productivethinks he may be allergic to cats     HPI  Cough  Pt c/o cough with associated burning sensation in chest. This is a new problem: Onset 01/02/2019.  Cough is dysuria. Skin: Negative for color change and pallor. Neurological: Negative for tremors and speech difficulty. Psychiatric/Behavioral: Negative for self-injury and suicidal ideas. The patient is not nervous/anxious.       HISTORY:  Past Medical Histor well-developed and well-nourished. HENT:   Head: Normocephalic and atraumatic.    Right Ear: External ear normal.   Left Ear: External ear normal.   Nose: Nose normal.   Eyes: Conjunctivae, EOM and lids are normal. Pupils are equal, round, and reactive to with associated burning sensation in chest. Pertinent negatives include no CP, chills, eye redness, nor SOB.  Physical examination unremarkable.  -rx guaiFENesin-codeine (CHERATUSSIN AC) 100-10 MG/5ML Oral Solution    (H53.9) Change in vision  Plan: Pt c/o

## 2019-01-08 NOTE — TELEPHONE ENCOUNTER
Page return. Told to do symptomatic treatment. Explained that we will not give antibiotics over the phone until been seen. Offered for him to go to urgent care as well if needed.

## 2019-01-09 ENCOUNTER — NURSE TRIAGE (OUTPATIENT)
Dept: OTHER | Age: 54
End: 2019-01-09

## 2019-01-09 NOTE — TELEPHONE ENCOUNTER
Patient saw Dr Veronika Walker on 1/7/19. Patient stated that still coughing-green phlegm. Short of breath and wheezing for the last 5 days. No audible wheezing or shortness of breath while speaking with me. Thinks has a fever but does not have thermometer.  Did not w

## 2019-01-10 RX ORDER — AZITHROMYCIN 250 MG/1
TABLET, FILM COATED ORAL
Qty: 1 PACKAGE | Refills: 0 | Status: SHIPPED | OUTPATIENT
Start: 2019-01-10 | End: 2019-03-04 | Stop reason: ALTCHOICE

## 2019-01-10 NOTE — TELEPHONE ENCOUNTER
Z-pack sent to patient's pharmacy. Attempted to notify patient, mailbox was full no option to leave a voicemail. My Chart message sent to patient.

## 2019-01-11 NOTE — TELEPHONE ENCOUNTER
[place]   to Vanessa Mendenhall RN           1/10/19 11:16 AM   thank you    Vanessa Mendenhall RN   to [place]           1/10/19 10:30 AM   Dr. Stefania Tatum approved the z-pack for your cough. The z-pack was sent to your pharmacy.  If yo

## 2019-02-14 ENCOUNTER — MOBILE ENCOUNTER (OUTPATIENT)
Dept: INTERNAL MEDICINE CLINIC | Facility: CLINIC | Age: 54
End: 2019-02-14

## 2019-03-01 ENCOUNTER — NURSE TRIAGE (OUTPATIENT)
Dept: OTHER | Age: 54
End: 2019-03-01

## 2019-03-01 NOTE — TELEPHONE ENCOUNTER
Action Requested: Summary for Provider     []  Critical Lab, Recommendations Needed  [x] Need Additional Advice  []   FYI    []   Need Orders  [] Need Medications Sent to Pharmacy  []  Other     SUMMARY: Patient calling with complaint of cough with chest c

## 2019-03-01 NOTE — TELEPHONE ENCOUNTER
Patient should be evaluated by physician today perhaps immediate care visit/ER if emergency    Patient can see me on Monday, March 4 at [address] [facility] office Rosmery approval appointment

## 2019-03-01 NOTE — TELEPHONE ENCOUNTER
Pt should be evaluated in person to determine need for antibiotic. May need chest x-ray to r/o pneumonia but will leave decision to evaluating physician.

## 2019-03-04 ENCOUNTER — OFFICE VISIT (OUTPATIENT)
Dept: INTERNAL MEDICINE CLINIC | Facility: CLINIC | Age: 54
End: 2019-03-04

## 2019-03-04 VITALS
HEIGHT: 72 IN | DIASTOLIC BLOOD PRESSURE: 84 MMHG | SYSTOLIC BLOOD PRESSURE: 156 MMHG | BODY MASS INDEX: 32.53 KG/M2 | WEIGHT: 240.19 LBS | HEART RATE: 74 BPM | TEMPERATURE: 98 F | RESPIRATION RATE: 20 BRPM

## 2019-03-04 DIAGNOSIS — Z12.11 SCREENING FOR MALIGNANT NEOPLASM OF COLON: ICD-10-CM

## 2019-03-04 DIAGNOSIS — I10 ESSENTIAL HYPERTENSION: Primary | ICD-10-CM

## 2019-03-04 DIAGNOSIS — R05.9 COUGH: ICD-10-CM

## 2019-03-04 DIAGNOSIS — Z12.5 SCREENING PSA (PROSTATE SPECIFIC ANTIGEN): ICD-10-CM

## 2019-03-04 PROCEDURE — 99214 OFFICE O/P EST MOD 30 MIN: CPT | Performed by: INTERNAL MEDICINE

## 2019-03-04 PROCEDURE — 99212 OFFICE O/P EST SF 10 MIN: CPT | Performed by: INTERNAL MEDICINE

## 2019-03-04 RX ORDER — FEXOFENADINE HCL 180 MG/1
180 TABLET ORAL DAILY
Qty: 30 TABLET | Refills: 0 | Status: SHIPPED | OUTPATIENT
Start: 2019-03-04 | End: 2019-03-31

## 2019-03-04 RX ORDER — FLUTICASONE PROPIONATE 50 MCG
2 SPRAY, SUSPENSION (ML) NASAL DAILY
Qty: 1 BOTTLE | Refills: 0 | Status: SHIPPED | OUTPATIENT
Start: 2019-03-04 | End: 2019-05-13

## 2019-03-04 RX ORDER — BISOPROLOL FUMARATE 5 MG/1
5 TABLET ORAL DAILY
Qty: 30 TABLET | Refills: 0 | Status: SHIPPED | OUTPATIENT
Start: 2019-03-04 | End: 2019-03-31

## 2019-03-04 RX ORDER — AZITHROMYCIN 250 MG/1
TABLET, FILM COATED ORAL
Qty: 6 TABLET | Refills: 0 | Status: SHIPPED | OUTPATIENT
Start: 2019-03-04 | End: 2019-05-13

## 2019-03-04 NOTE — PROGRESS NOTES
HPI:    Patient ID: Fletcher Piña is a 62year old male. Patient presents with:  Sinus Problem: Pt state that he has sinus problem with coughing and chest congestion    Sinus Problem   This is a new problem. The current episode started in the past 7 days. two puffs in each nostril once daily for 1-2 weeks then as needed Disp: 1 Bottle Rfl: 0   carvedilol 6.25 MG Oral Tab Take 1 tablet (6.25 mg total) by mouth 2 (two) times daily with meals.  Disp: 60 tablet Rfl: 1   Albuterol Sulfate  (90 Base) MCG/AC Musculoskeletal: He exhibits no edema. Lymphadenopathy:     He has no cervical adenopathy. Neurological: He is alert and oriented to person, place, and time. Skin: Skin is warm and dry. Psychiatric: He has a normal mood and affect.  His behavior i once daily for 1-2 weeks and then as needed. • Fluticasone Propionate 50 MCG/ACT Nasal Suspension 1 Bottle 0     Si sprays by Each Nare route daily.  Apply two puffs in each nostril once daily for 1-2 weeks then as needed       Imaging & Referrals:  G

## 2019-03-31 RX ORDER — FEXOFENADINE HCL 180 MG/1
180 TABLET ORAL DAILY
Qty: 30 TABLET | Refills: 0 | Status: SHIPPED | OUTPATIENT
Start: 2019-03-31 | End: 2019-05-13

## 2019-03-31 NOTE — TELEPHONE ENCOUNTER
Please review; protocol failed.   Hypertensive Medications  Protocol Criteria:  · Appointment scheduled in the past 6 months or in the next 3 months  · BMP or CMP in the past 12 months  · Creatinine result < 2  Recent Outpatient Visits            3 weeks ag non-recurrent sinusitis, unspecified location    [facility], [facility], [facility], [facility] MD Frans[place]    Office Visit    1 year ago Acute viral bronchitis    [address] [place] [address]    Office Visit

## 2019-04-05 RX ORDER — BISOPROLOL FUMARATE 5 MG/1
TABLET ORAL
Qty: 30 TABLET | Refills: 0 | Status: SHIPPED | OUTPATIENT
Start: 2019-04-05 | End: 2019-05-19

## 2019-04-05 NOTE — TELEPHONE ENCOUNTER
Dr Lanny Szymanski,    See messages below and advise on pended failed medication.      Please reply to misa: CHANG Smith

## 2019-05-08 NOTE — TELEPHONE ENCOUNTER
Neurology referral approved patient to see Dr. Rufino Barker as he was advised    Also referral to see psychologist and new psychiatrist was placed in the system patient will get a call from our psychology team      If patient does not get a phone call please ad

## 2019-05-08 NOTE — TELEPHONE ENCOUNTER
Pt  Father  Should  Talk  To   Psychiatrist   Regarding mental issue    - Dr Julita Diaz     Also  There is no  HIPPA  On  Our  file .

## 2019-05-08 NOTE — TELEPHONE ENCOUNTER
Father stating patient did not go to neurologist that he was referred to in past but that now patient is more agreeable to going and requests a new referral. Contacted patient and patient agreeable to referral. Patient requested new script for neurology an

## 2019-05-10 NOTE — TELEPHONE ENCOUNTER
No answer and mailbox full, unable to leave message  [employer] message sent asking pt to call office

## 2019-05-13 ENCOUNTER — OFFICE VISIT (OUTPATIENT)
Dept: INTERNAL MEDICINE CLINIC | Facility: CLINIC | Age: 54
End: 2019-05-13

## 2019-05-13 VITALS
HEIGHT: 72 IN | DIASTOLIC BLOOD PRESSURE: 82 MMHG | SYSTOLIC BLOOD PRESSURE: 144 MMHG | BODY MASS INDEX: 32.26 KG/M2 | HEART RATE: 66 BPM | WEIGHT: 238.19 LBS | TEMPERATURE: 98 F

## 2019-05-13 DIAGNOSIS — R00.2 PALPITATION: ICD-10-CM

## 2019-05-13 DIAGNOSIS — R09.81 SINUS CONGESTION: Primary | ICD-10-CM

## 2019-05-13 DIAGNOSIS — I10 ESSENTIAL HYPERTENSION: ICD-10-CM

## 2019-05-13 DIAGNOSIS — J06.9 VIRAL UPPER RESPIRATORY TRACT INFECTION: ICD-10-CM

## 2019-05-13 DIAGNOSIS — R05.9 COUGH: ICD-10-CM

## 2019-05-13 DIAGNOSIS — R53.83 OTHER FATIGUE: ICD-10-CM

## 2019-05-13 DIAGNOSIS — F32.A DEPRESSION, UNSPECIFIED DEPRESSION TYPE: ICD-10-CM

## 2019-05-13 PROCEDURE — 99214 OFFICE O/P EST MOD 30 MIN: CPT | Performed by: INTERNAL MEDICINE

## 2019-05-13 PROCEDURE — 99212 OFFICE O/P EST SF 10 MIN: CPT | Performed by: INTERNAL MEDICINE

## 2019-05-13 RX ORDER — BENZONATATE 100 MG/1
100 CAPSULE ORAL 2 TIMES DAILY PRN
Qty: 10 CAPSULE | Refills: 0 | Status: SHIPPED | OUTPATIENT
Start: 2019-05-13 | End: 2019-08-20

## 2019-05-13 RX ORDER — HYDROCHLOROTHIAZIDE 12.5 MG/1
12.5 TABLET ORAL DAILY
Qty: 90 TABLET | Refills: 3 | Status: SHIPPED | OUTPATIENT
Start: 2019-05-13 | End: 2019-08-20

## 2019-05-13 NOTE — PROGRESS NOTES
Aiyana Whatley is a 62year old male.   Patient presents with:  Sinusitis: sinus pressure, congestion       HPI:   Pt comes as an urgent visit   C/c sinus pressure   C/o sinus pressure and congestion x 3 days   Ros as below   Also noted bp is high       PMH feet  NEURO: denies headaches , anxiety, + depression    EXAM:   /82 (BP Location: Left arm, Patient Position: Sitting, Cuff Size: large)   Pulse 66   Temp 98.4 °F (36.9 °C) (Oral)   Ht 6' (1.829 m)   Wt 238 lb 3.2 oz (108 kg)   BMI 32.31 kg/m²   GEN

## 2019-05-13 NOTE — TELEPHONE ENCOUNTER
Pt also reports started cold symptoms last Saturday, getting worse. Pt coughing up 'colored phlegm' . Denies, HA, SOB, fever. He is not taking any medications for this symptoms but wants to get Zpak. Pt wants to be seen today. Advised Dr Asa Don has no

## 2019-05-13 NOTE — PATIENT INSTRUCTIONS
Viral Upper Respiratory Illness (Adult)    You have a viral upper respiratory illness (URI), which is another term for the common cold. This illness is contagious during the first few days. It is spread through the air by coughing and sneezing.  It may al · Over-the-counter cold medicines will not shorten the length of time you’re sick, but they may be helpful for the following symptoms: cough, sore throat, and nasal and sinus congestion.  If you take prescription medicines, ask your healthcare provider or p

## 2019-05-20 NOTE — TELEPHONE ENCOUNTER
Requested Prescriptions     Pending Prescriptions Disp Refills   • BISOPROLOL FUMARATE 5 MG Oral Tab [Pharmacy Med Name: Bisoprolol Fumarate 5 Mg Tab [facility]] 90 tablet 1     Sig: TAKE ONE TABLET BY MOUTH ONE TIME DAILY         Recent Visits  Date Type Provid

## 2019-05-21 RX ORDER — BISOPROLOL FUMARATE 5 MG/1
TABLET ORAL
Qty: 90 TABLET | Refills: 1 | Status: SHIPPED | OUTPATIENT
Start: 2019-05-21 | End: 2019-08-20

## 2019-06-17 NOTE — TELEPHONE ENCOUNTER
Fax received requesting refill for bystolic 10 mg tablets, 30 quantity, take 1 tablet by mouth daily.   Not on med list.

## 2019-06-18 RX ORDER — NEBIVOLOL 10 MG/1
10 TABLET ORAL DAILY
Qty: 90 TABLET | Refills: 1 | OUTPATIENT
Start: 2019-06-18

## 2019-06-28 RX ORDER — NEBIVOLOL 10 MG/1
10 TABLET ORAL DAILY
Qty: 30 TABLET | Refills: 0 | Status: SHIPPED | OUTPATIENT
Start: 2019-06-28 | End: 2020-10-21 | Stop reason: DRUGHIGH

## 2019-07-01 ENCOUNTER — TELEPHONE (OUTPATIENT)
Dept: INTERNAL MEDICINE CLINIC | Facility: CLINIC | Age: 54
End: 2019-07-01

## 2019-07-01 NOTE — TELEPHONE ENCOUNTER
PA forBystolic 10 mg tab completed with [employer] via CMM response time 3-5 business days KEY [id number].

## 2019-07-01 NOTE — TELEPHONE ENCOUNTER
Fax received requesting ePA. Key [id number]. Current Outpatient Medications:  Nebivolol HCl (BYSTOLIC) 10 MG Oral Tab Take 1 tablet (10 mg total) by mouth daily.  Disp: 30 tablet Rfl: 0

## 2019-07-02 NOTE — TELEPHONE ENCOUNTER
PA approved effective 7/1/2019-7/1/2020.  [place] pharmacist Manny Guaman notified of the approval.

## 2019-07-04 ENCOUNTER — MOBILE ENCOUNTER (OUTPATIENT)
Dept: INTERNAL MEDICINE CLINIC | Facility: CLINIC | Age: 54
End: 2019-07-04

## 2019-07-04 RX ORDER — AZITHROMYCIN 250 MG/1
TABLET, FILM COATED ORAL
Qty: 6 TABLET | Refills: 0 | Status: SHIPPED | OUTPATIENT
Start: 2019-07-04 | End: 2019-08-20

## 2019-07-04 RX ORDER — AZITHROMYCIN 250 MG/1
TABLET, FILM COATED ORAL
Qty: 6 TABLET | Refills: 0 | Status: SHIPPED | OUTPATIENT
Start: 2019-07-04 | End: 2019-07-04

## 2019-07-04 NOTE — PROGRESS NOTES
Received a page about greenish sputum and cough. Treated for viral uri on 5/17. Was given tessalon pearls. Complaints of cough, sputum production and sinus pressure. Sending zpack. Strongly recommended wic/ic .   Patient states he will go on friday if no

## 2019-08-20 ENCOUNTER — OFFICE VISIT (OUTPATIENT)
Dept: INTERNAL MEDICINE CLINIC | Facility: CLINIC | Age: 54
End: 2019-08-20

## 2019-08-20 ENCOUNTER — NURSE TRIAGE (OUTPATIENT)
Dept: OTHER | Age: 54
End: 2019-08-20

## 2019-08-20 VITALS
RESPIRATION RATE: 18 BRPM | HEART RATE: 75 BPM | WEIGHT: 236 LBS | DIASTOLIC BLOOD PRESSURE: 91 MMHG | SYSTOLIC BLOOD PRESSURE: 146 MMHG | BODY MASS INDEX: 32 KG/M2

## 2019-08-20 DIAGNOSIS — Z13.9 SCREENING FOR CONDITION: ICD-10-CM

## 2019-08-20 DIAGNOSIS — M79.662 PAIN OF LEFT CALF: ICD-10-CM

## 2019-08-20 DIAGNOSIS — R25.2 MUSCLE CRAMPING: Primary | ICD-10-CM

## 2019-08-20 DIAGNOSIS — I10 ESSENTIAL HYPERTENSION: ICD-10-CM

## 2019-08-20 PROCEDURE — 99214 OFFICE O/P EST MOD 30 MIN: CPT | Performed by: INTERNAL MEDICINE

## 2019-08-20 NOTE — TELEPHONE ENCOUNTER
Action Requested: Summary for Provider     []  Critical Lab, Recommendations Needed  [] Need Additional Advice  [x]   FYI    []   Need Orders  [] Need Medications Sent to Pharmacy  []  Other     SUMMARY: Patient stated that had a tooth infection in the pas

## 2019-08-20 NOTE — PROGRESS NOTES
HPI:    Patient ID: Perla Ritter is a 62year old male. Presents for evaluation of several concerns. HPI  Patient has history of left leg DVT, treated several years ago.   He states that 2 days ago he was standing on a concert got severe cramp in the l Resp 18   Wt 236 lb (107 kg)   BMI 32.01 kg/m²    Physical Exam    Constitutional: He is oriented to person, place, and time. He appears well-developed and well-nourished. HENT:   Head: Normocephalic and atraumatic.    Right Ear: Tympanic membrane is not Panel [E]      PSA (Screening) [E]      Meds This Visit:  Requested Prescriptions      No prescriptions requested or ordered in this encounter       Imaging & Referrals:  US VENOUS DOPPLER LEG LEFT - DIAG IMG (MTI=[id number])         WZ#[id number]

## 2019-08-23 ENCOUNTER — LAB ENCOUNTER (OUTPATIENT)
Dept: LAB | Facility: HOSPITAL | Age: 54
End: 2019-08-23
Attending: INTERNAL MEDICINE
Payer: COMMERCIAL

## 2019-08-23 DIAGNOSIS — Z12.5 SCREENING PSA (PROSTATE SPECIFIC ANTIGEN): ICD-10-CM

## 2019-08-23 DIAGNOSIS — R53.83 OTHER FATIGUE: ICD-10-CM

## 2019-08-23 DIAGNOSIS — I10 ESSENTIAL HYPERTENSION: ICD-10-CM

## 2019-08-23 DIAGNOSIS — R00.2 PALPITATION: ICD-10-CM

## 2019-08-23 DIAGNOSIS — Z13.9 SCREENING FOR CONDITION: ICD-10-CM

## 2019-08-23 DIAGNOSIS — R25.2 MUSCLE CRAMPING: ICD-10-CM

## 2019-08-23 LAB
ALBUMIN SERPL-MCNC: 4.2 G/DL (ref 3.4–5)
ALBUMIN/GLOB SERPL: 1.1 {RATIO} (ref 1–2)
ALP LIVER SERPL-CCNC: 61 U/L (ref 45–117)
ALT SERPL-CCNC: 45 U/L (ref 16–61)
ANION GAP SERPL CALC-SCNC: 9 MMOL/L (ref 0–18)
AST SERPL-CCNC: 25 U/L (ref 15–37)
BASOPHILS # BLD AUTO: 0.06 X10(3) UL (ref 0–0.2)
BASOPHILS NFR BLD AUTO: 1 %
BILIRUB SERPL-MCNC: 0.6 MG/DL (ref 0.1–2)
BUN BLD-MCNC: 16 MG/DL (ref 7–18)
BUN/CREAT SERPL: 15 (ref 10–20)
CALCIUM BLD-MCNC: 8.7 MG/DL (ref 8.5–10.1)
CHLORIDE SERPL-SCNC: 107 MMOL/L (ref 98–112)
CHOLEST SMN-MCNC: 200 MG/DL (ref ?–200)
CO2 SERPL-SCNC: 26 MMOL/L (ref 21–32)
COMPLEXED PSA SERPL-MCNC: 2.21 NG/ML (ref ?–4)
CREAT BLD-MCNC: 1.07 MG/DL (ref 0.7–1.3)
DEPRECATED RDW RBC AUTO: 43.5 FL (ref 35.1–46.3)
EOSINOPHIL # BLD AUTO: 0.29 X10(3) UL (ref 0–0.7)
EOSINOPHIL NFR BLD AUTO: 4.8 %
ERYTHROCYTE [DISTWIDTH] IN BLOOD BY AUTOMATED COUNT: 12.3 % (ref 11–15)
EST. AVERAGE GLUCOSE BLD GHB EST-MCNC: 105 MG/DL (ref 68–126)
GLOBULIN PLAS-MCNC: 3.9 G/DL (ref 2.8–4.4)
GLUCOSE BLD-MCNC: 77 MG/DL (ref 70–99)
HBA1C MFR BLD HPLC: 5.3 % (ref ?–5.7)
HCT VFR BLD AUTO: 48.2 % (ref 39–53)
HDLC SERPL-MCNC: 32 MG/DL (ref 40–59)
HGB BLD-MCNC: 16.9 G/DL (ref 13–17.5)
IMM GRANULOCYTES # BLD AUTO: 0.01 X10(3) UL (ref 0–1)
IMM GRANULOCYTES NFR BLD: 0.2 %
LDLC SERPL CALC-MCNC: 114 MG/DL (ref ?–100)
LYMPHOCYTES # BLD AUTO: 2.01 X10(3) UL (ref 1–4)
LYMPHOCYTES NFR BLD AUTO: 33.3 %
M PROTEIN MFR SERPL ELPH: 8.1 G/DL (ref 6.4–8.2)
MCH RBC QN AUTO: 33.8 PG (ref 26–34)
MCHC RBC AUTO-ENTMCNC: 35.1 G/DL (ref 31–37)
MCV RBC AUTO: 96.4 FL (ref 80–100)
MONOCYTES # BLD AUTO: 0.58 X10(3) UL (ref 0.1–1)
MONOCYTES NFR BLD AUTO: 9.6 %
NEUTROPHILS # BLD AUTO: 3.09 X10 (3) UL (ref 1.5–7.7)
NEUTROPHILS # BLD AUTO: 3.09 X10(3) UL (ref 1.5–7.7)
NEUTROPHILS NFR BLD AUTO: 51.1 %
NONHDLC SERPL-MCNC: 168 MG/DL (ref ?–130)
OSMOLALITY SERPL CALC.SUM OF ELEC: 294 MOSM/KG (ref 275–295)
PATIENT FASTING: YES
PATIENT FASTING: YES
PLATELET # BLD AUTO: 190 10(3)UL (ref 150–450)
POTASSIUM SERPL-SCNC: 4 MMOL/L (ref 3.5–5.1)
RBC # BLD AUTO: 5 X10(6)UL (ref 4.3–5.7)
SODIUM SERPL-SCNC: 142 MMOL/L (ref 136–145)
TRIGL SERPL-MCNC: 270 MG/DL (ref 30–149)
TSI SER-ACNC: 2.23 MIU/ML (ref 0.36–3.74)
VLDLC SERPL CALC-MCNC: 54 MG/DL (ref 0–30)
WBC # BLD AUTO: 6 X10(3) UL (ref 4–11)

## 2019-08-23 PROCEDURE — 84443 ASSAY THYROID STIM HORMONE: CPT

## 2019-08-23 PROCEDURE — 36415 COLL VENOUS BLD VENIPUNCTURE: CPT

## 2019-08-23 PROCEDURE — 80053 COMPREHEN METABOLIC PANEL: CPT

## 2019-08-23 PROCEDURE — 85025 COMPLETE CBC W/AUTO DIFF WBC: CPT

## 2019-08-23 PROCEDURE — 80061 LIPID PANEL: CPT

## 2019-08-23 PROCEDURE — 83036 HEMOGLOBIN GLYCOSYLATED A1C: CPT

## 2019-08-28 ENCOUNTER — HOSPITAL ENCOUNTER (OUTPATIENT)
Dept: ULTRASOUND IMAGING | Age: 54
Discharge: HOME | End: 2019-08-28
Attending: INTERNAL MEDICINE
Payer: COMMERCIAL

## 2019-08-28 DIAGNOSIS — M79.662 PAIN OF LEFT CALF: ICD-10-CM

## 2019-08-28 PROCEDURE — 93971 EXTREMITY STUDY: CPT | Performed by: INTERNAL MEDICINE

## 2019-09-09 ENCOUNTER — TELEPHONE (OUTPATIENT)
Dept: CARDIOLOGY CLINIC | Facility: CLINIC | Age: 54
End: 2019-09-09

## 2019-09-09 ENCOUNTER — OFFICE VISIT (OUTPATIENT)
Dept: INTERNAL MEDICINE CLINIC | Facility: CLINIC | Age: 54
End: 2019-09-09

## 2019-09-09 VITALS
TEMPERATURE: 98 F | BODY MASS INDEX: 32.05 KG/M2 | WEIGHT: 236.63 LBS | HEART RATE: 82 BPM | HEIGHT: 72 IN | DIASTOLIC BLOOD PRESSURE: 98 MMHG | RESPIRATION RATE: 20 BRPM | SYSTOLIC BLOOD PRESSURE: 150 MMHG

## 2019-09-09 DIAGNOSIS — Z12.12 SCREENING FOR COLORECTAL CANCER: ICD-10-CM

## 2019-09-09 DIAGNOSIS — E78.2 MIXED HYPERLIPIDEMIA: ICD-10-CM

## 2019-09-09 DIAGNOSIS — Z12.11 SCREENING FOR COLORECTAL CANCER: ICD-10-CM

## 2019-09-09 DIAGNOSIS — I10 ESSENTIAL HYPERTENSION: Primary | ICD-10-CM

## 2019-09-09 PROCEDURE — 99214 OFFICE O/P EST MOD 30 MIN: CPT | Performed by: INTERNAL MEDICINE

## 2019-09-09 RX ORDER — NEBIVOLOL 10 MG/1
10 TABLET ORAL DAILY
Qty: 30 TABLET | Refills: 1 | Status: CANCELLED | OUTPATIENT
Start: 2019-09-09

## 2019-09-09 RX ORDER — CLOTRIMAZOLE AND BETAMETHASONE DIPROPIONATE 10; .64 MG/G; MG/G
CREAM TOPICAL
Qty: 60 G | Refills: 0 | Status: SHIPPED | OUTPATIENT
Start: 2019-09-09

## 2019-09-09 RX ORDER — BISOPROLOL FUMARATE 5 MG/1
5 TABLET ORAL EVERY MORNING
Qty: 30 TABLET | Refills: 0 | Status: SHIPPED | OUTPATIENT
Start: 2019-09-09 | End: 2019-10-06

## 2019-09-09 NOTE — PROGRESS NOTES
HPI:    Patient ID: Josue Alvarado is a 62year old male. Patient presents with:  Test Results: Pt is f/u recent labs    bp check     Patient in office today for  BP  Check   here for  Patient states feeling well otherwise.  Denies chest pain, shortnesss of sinus tenderness. Left sinus exhibits no maxillary sinus tenderness and no frontal sinus tenderness. Mouth/Throat: Uvula is midline and oropharynx is clear and moist. No oropharyngeal exudate or posterior oropharyngeal erythema.    Eyes: Right eye exhibit Foot   likely Fungus  Clotrimazole twice daily for 1 week  Side effects and directions of medication discussed with patient. Patient verbalized understanding and compliance. No orders of the defined types were placed in this encounter.       Meds This V

## 2019-09-10 NOTE — TELEPHONE ENCOUNTER
Prior authorization for Bisoprolol Fumarate 5MG oral tab completed w/ [employer] on cover my meds Key:[id number], turn around time 24-72 hrs.

## 2019-09-13 NOTE — TELEPHONE ENCOUNTER
Plan states medication does not require a PA it is covered. Spoke with [facility] pharmacist Michelle Ac and advised. Per Michelle Ac rx was processed and dispensed to patient on 9/9/19 paid for by insurance.

## 2019-10-07 RX ORDER — BISOPROLOL FUMARATE 5 MG/1
5 TABLET ORAL EVERY MORNING
Qty: 90 TABLET | Refills: 0 | Status: SHIPPED | OUTPATIENT
Start: 2019-10-07 | End: 2020-01-15

## 2019-10-08 NOTE — TELEPHONE ENCOUNTER
Refill passed per [facility] protocol.   Hypertensive Medications  Protocol Criteria:  · Appointment scheduled in the past 6 months or in the next 3 months  · BMP or CMP in the past 12 months  · Creatinine result < 2  Recent Outpatient Visits

## 2019-12-13 ENCOUNTER — NURSE TRIAGE (OUTPATIENT)
Dept: INTERNAL MEDICINE CLINIC | Facility: CLINIC | Age: 54
End: 2019-12-13

## 2019-12-13 NOTE — TELEPHONE ENCOUNTER
Patient called in stating that he has not been able to hear in his right ear for about 1 week. Also, states that he has been coughing and having green sputum. He states that he recently had a root canal and was not given antibiotics either.      Lola

## 2019-12-13 NOTE — TELEPHONE ENCOUNTER
Action Requested: Summary for Provider     []  Critical Lab, Recommendations Needed  [] Need Additional Advice  []   FYI    []   Need Orders  [] Need Medications Sent to Pharmacy  []  Other     SUMMARY: Patient requesting to be seen for upper respiratory s

## 2019-12-14 ENCOUNTER — OFFICE VISIT (OUTPATIENT)
Dept: INTERNAL MEDICINE CLINIC | Facility: CLINIC | Age: 54
End: 2019-12-14

## 2019-12-14 VITALS
TEMPERATURE: 98 F | HEIGHT: 72 IN | BODY MASS INDEX: 32.9 KG/M2 | HEART RATE: 62 BPM | SYSTOLIC BLOOD PRESSURE: 150 MMHG | DIASTOLIC BLOOD PRESSURE: 95 MMHG | WEIGHT: 242.88 LBS

## 2019-12-14 DIAGNOSIS — H61.21 CERUMEN DEBRIS ON TYMPANIC MEMBRANE OF RIGHT EAR: ICD-10-CM

## 2019-12-14 DIAGNOSIS — I10 ESSENTIAL HYPERTENSION: ICD-10-CM

## 2019-12-14 DIAGNOSIS — J01.90 ACUTE NON-RECURRENT SINUSITIS, UNSPECIFIED LOCATION: Primary | ICD-10-CM

## 2019-12-14 PROCEDURE — 99213 OFFICE O/P EST LOW 20 MIN: CPT | Performed by: INTERNAL MEDICINE

## 2019-12-14 RX ORDER — AMOXICILLIN AND CLAVULANATE POTASSIUM 875; 125 MG/1; MG/1
1 TABLET, FILM COATED ORAL 2 TIMES DAILY
Qty: 20 TABLET | Refills: 0 | Status: SHIPPED | OUTPATIENT
Start: 2019-12-14 | End: 2019-12-26

## 2019-12-14 NOTE — PATIENT INSTRUCTIONS
Use Debrox ear drops every night for 2-3 weeks, then schedule an appointment for an earwash if you are not are not better.

## 2019-12-14 NOTE — PROGRESS NOTES
HPI:    Patient ID: Nigel Lawrence is a 62year old male. Pt has been sick for a week. Sinus Problem   This is a new problem. The current episode started in the past 7 days. The problem is unchanged. There has been no fever.  Associated symptoms includ .BP (!) 150/95 (BP Location: Right arm, Patient Position: Sitting, Cuff Size: large)   Pulse 62   Temp 97.9 °F (36.6 °C) (Oral)   Ht 6' (1.829 m)   Wt 242 lb 14.4 oz (110.2 kg)   BMI 32.94 kg/m²   PHYSICAL EXAM:   Physical Exam   Nursing note and vit

## 2019-12-24 DIAGNOSIS — J01.90 ACUTE NON-RECURRENT SINUSITIS, UNSPECIFIED LOCATION: ICD-10-CM

## 2019-12-25 ENCOUNTER — TELEPHONE (OUTPATIENT)
Dept: OTHER | Age: 54
End: 2019-12-25

## 2019-12-25 DIAGNOSIS — J01.90 ACUTE NON-RECURRENT SINUSITIS, UNSPECIFIED LOCATION: ICD-10-CM

## 2019-12-25 RX ORDER — AMOXICILLIN AND CLAVULANATE POTASSIUM 875; 125 MG/1; MG/1
TABLET, FILM COATED ORAL
Qty: 20 TABLET | Refills: 0 | OUTPATIENT
Start: 2019-12-25

## 2019-12-25 NOTE — TELEPHONE ENCOUNTER
RN pls call pt and verify the need of rx refill, pls triage or offer ov if needed, thanks. Also [employer] message sent to pt.        Requested Prescriptions     Pending Prescriptions Disp Refills   • Amoxicillin-Pot Clavulanate 875-125 MG Oral Tab 20 tablet

## 2019-12-26 RX ORDER — AMOXICILLIN AND CLAVULANATE POTASSIUM 875; 125 MG/1; MG/1
1 TABLET, FILM COATED ORAL 2 TIMES DAILY
Qty: 16 TABLET | Refills: 0 | Status: SHIPPED | OUTPATIENT
Start: 2019-12-26 | End: 2020-01-03

## 2019-12-26 NOTE — TELEPHONE ENCOUNTER
Mailbox full. Patient prescribed Amoxicillin 12/14 with a 10 day supply. Possible error from pharmacy.

## 2019-12-26 NOTE — TELEPHONE ENCOUNTER
Requested Prescriptions     Signed Prescriptions Disp Refills   • Amoxicillin-Pot Clavulanate 875-125 MG Oral Tab 16 tablet 0     Sig: Take 1 tablet by mouth 2 (two) times daily for 8 days.      Authorizing Provider: Navid Toro

## 2019-12-26 NOTE — TELEPHONE ENCOUNTER
Patient returned call, reports started antibiotic for 2 days and bottle spilled, pills got ruined, requesting if script can be sent for remaining 8 days, reports is still having symptoms would like to complete antibiotic.  Pt was advised insurance may not c

## 2019-12-27 NOTE — TELEPHONE ENCOUNTER
Advised patient's father on VARINDER of the note below. Tried to call patient and his other contacts. All voicemail boxes were full.

## 2020-01-15 ENCOUNTER — TELEPHONE (OUTPATIENT)
Dept: INTERNAL MEDICINE CLINIC | Facility: CLINIC | Age: 55
End: 2020-01-15

## 2020-01-15 ENCOUNTER — HOSPITAL ENCOUNTER (OUTPATIENT)
Age: 55
Discharge: HOME | End: 2020-01-15
Attending: FAMILY MEDICINE
Payer: COMMERCIAL

## 2020-01-15 ENCOUNTER — APPOINTMENT (OUTPATIENT)
Dept: GENERAL RADIOLOGY | Age: 55
End: 2020-01-15
Attending: FAMILY MEDICINE
Payer: COMMERCIAL

## 2020-01-15 VITALS
HEART RATE: 90 BPM | TEMPERATURE: 98 F | DIASTOLIC BLOOD PRESSURE: 92 MMHG | BODY MASS INDEX: 32.51 KG/M2 | SYSTOLIC BLOOD PRESSURE: 180 MMHG | HEIGHT: 72 IN | WEIGHT: 240 LBS | RESPIRATION RATE: 20 BRPM | OXYGEN SATURATION: 96 %

## 2020-01-15 DIAGNOSIS — J32.9 SINUSITIS, UNSPECIFIED CHRONICITY, UNSPECIFIED LOCATION: ICD-10-CM

## 2020-01-15 DIAGNOSIS — I10 UNCONTROLLED HYPERTENSION: ICD-10-CM

## 2020-01-15 DIAGNOSIS — R05.9 COUGH: Primary | ICD-10-CM

## 2020-01-15 PROCEDURE — 99213 OFFICE O/P EST LOW 20 MIN: CPT

## 2020-01-15 PROCEDURE — 99204 OFFICE O/P NEW MOD 45 MIN: CPT

## 2020-01-15 PROCEDURE — 71046 X-RAY EXAM CHEST 2 VIEWS: CPT | Performed by: FAMILY MEDICINE

## 2020-01-15 RX ORDER — AZITHROMYCIN 250 MG/1
TABLET, FILM COATED ORAL
Qty: 1 PACKAGE | Refills: 0 | Status: SHIPPED | OUTPATIENT
Start: 2020-01-15 | End: 2020-01-20

## 2020-01-15 RX ORDER — ALBUTEROL SULFATE 90 UG/1
2 AEROSOL, METERED RESPIRATORY (INHALATION) EVERY 4 HOURS PRN
Qty: 1 INHALER | Refills: 0 | Status: SHIPPED | OUTPATIENT
Start: 2020-01-15 | End: 2020-02-06

## 2020-01-15 RX ORDER — BISOPROLOL FUMARATE 5 MG/1
5 TABLET ORAL EVERY MORNING
Qty: 90 TABLET | Refills: 0 | Status: SHIPPED | OUTPATIENT
Start: 2020-01-15 | End: 2020-03-25

## 2020-01-15 NOTE — TELEPHONE ENCOUNTER
Patient states he is out of Bisoprolol Fumarate 5 mg. Refilling per protocol.   Hypertensive Medications  Protocol Criteria:  · Appointment scheduled in the past 6 months or in the next 3 months  · BMP or CMP in the past 12 months  · Creatinine result < 2

## 2020-01-15 NOTE — TELEPHONE ENCOUNTER
Patient states he has a headache, cough and fever (no thermometer) now on second day. States symptoms similar to when seen by Dr. Mona Merchant on 12/14 for sinusitis and cough.     Patient requesting another refill for Amoxicillin as \"this is what worked last

## 2020-01-16 NOTE — TELEPHONE ENCOUNTER
Spoke with patient ( verified) and relayed Dr. Tigist Horta message below--patient verbalizes understanding and agreement. Contacted Lory Lefort, [facility] to assist with UC visit in [place] per patient request. No further questions/concerns at this time.

## 2020-01-16 NOTE — ED INITIAL ASSESSMENT (HPI)
Pt c/o headache since waking up 3-4/10 after taking Aspirin 325 mg, pt states he has been feeling warm,but has not checked temp at home. Pt also c/o cough over the past month, and finished Amoxicillin this past Friday.  Pt stopped taking BP meds over the pa

## 2020-01-16 NOTE — ED PROVIDER NOTES
Patient Seen in: [facility]      History   Patient presents with:  Headache  Cough/URI    Stated Complaint: headaches and cough x 7 days     HPI    49-year-old male with previous history of hypertension presents with compla HPI.  Constitutional and vital signs reviewed. All other systems reviewed and negative except as noted above.     Physical Exam     ED Triage Vitals [01/15/20 1948]   BP (!) 180/92   Pulse 90   Resp 20   Temp 98.1 °F (36.7 °C)   Temp src Temporal   SpO atelectasis/scarring in the lower lungs.     Dictated by: Goldie Delvalle MD on 1/15/2020 at 20:17     Approved by: Goldie Delvalle MD on 1/15/2020 at 20:17                MDM       Patient with sinus symptoms for the past 2 weeks now with a cough for the p

## 2020-01-30 RX ORDER — BISOPROLOL FUMARATE 5 MG/1
TABLET, FILM COATED ORAL
Qty: 90 TABLET | Refills: 0 | OUTPATIENT
Start: 2020-01-30

## 2020-02-04 ENCOUNTER — TELEPHONE (OUTPATIENT)
Dept: INTERNAL MEDICINE CLINIC | Facility: CLINIC | Age: 55
End: 2020-02-04

## 2020-02-04 RX ORDER — FEXOFENADINE HCL 180 MG/1
180 TABLET ORAL DAILY
Qty: 30 TABLET | Refills: 0 | Status: SHIPPED | OUTPATIENT
Start: 2020-02-04 | End: 2020-02-06

## 2020-02-04 NOTE — TELEPHONE ENCOUNTER
Patient calling with update, went to  on 1/15/2019 dx with Bronchitis;  finished Z pack, started to \" fell better'   Feels  like \"it went away and now returned\"  Remains with dry cough, denies fever, runny nose, sore throat     Believes he may be sujey

## 2020-02-04 NOTE — TELEPHONE ENCOUNTER
Patient should be seen in office for f/u visit    can try meantime Allegra 180 mg   otc  plain--I will send prescription to the pharmacy  for the allergies daily    Patient to schedule appointment soon

## 2020-02-05 NOTE — TELEPHONE ENCOUNTER
Pt calling back and MD message given. Pt verb understanding and agrees to plan. OV scheduled 2/6/20 with Dr Kwasi Contreras. Pt states he has allergra at home and will use that.

## 2020-02-06 ENCOUNTER — OFFICE VISIT (OUTPATIENT)
Dept: INTERNAL MEDICINE CLINIC | Facility: CLINIC | Age: 55
End: 2020-02-06

## 2020-02-06 VITALS
HEART RATE: 80 BPM | BODY MASS INDEX: 33.13 KG/M2 | OXYGEN SATURATION: 94 % | RESPIRATION RATE: 20 BRPM | DIASTOLIC BLOOD PRESSURE: 80 MMHG | HEIGHT: 72 IN | WEIGHT: 244.63 LBS | TEMPERATURE: 98 F | SYSTOLIC BLOOD PRESSURE: 138 MMHG

## 2020-02-06 DIAGNOSIS — K21.9 GASTROESOPHAGEAL REFLUX DISEASE WITHOUT ESOPHAGITIS: ICD-10-CM

## 2020-02-06 DIAGNOSIS — I10 ESSENTIAL HYPERTENSION: ICD-10-CM

## 2020-02-06 DIAGNOSIS — J30.9 ALLERGIC RHINITIS, UNSPECIFIED SEASONALITY, UNSPECIFIED TRIGGER: ICD-10-CM

## 2020-02-06 DIAGNOSIS — Z12.11 SCREENING FOR COLON CANCER: ICD-10-CM

## 2020-02-06 DIAGNOSIS — R05.9 COUGH: Primary | ICD-10-CM

## 2020-02-06 PROCEDURE — 99214 OFFICE O/P EST MOD 30 MIN: CPT | Performed by: INTERNAL MEDICINE

## 2020-02-06 RX ORDER — OMEPRAZOLE 40 MG/1
40 CAPSULE, DELAYED RELEASE ORAL DAILY
Qty: 90 CAPSULE | Refills: 1 | Status: SHIPPED | OUTPATIENT
Start: 2020-02-06 | End: 2020-03-07

## 2020-02-06 RX ORDER — AZITHROMYCIN 250 MG/1
TABLET, FILM COATED ORAL
Qty: 6 TABLET | Refills: 0 | Status: SHIPPED | OUTPATIENT
Start: 2020-02-06 | End: 2020-10-21 | Stop reason: ALTCHOICE

## 2020-02-06 RX ORDER — AMOXICILLIN 875 MG/1
500 TABLET, COATED ORAL 3 TIMES DAILY
Refills: 0 | COMMUNITY
Start: 2019-09-10 | End: 2020-10-21 | Stop reason: ALTCHOICE

## 2020-02-06 RX ORDER — FEXOFENADINE HCL 180 MG/1
180 TABLET ORAL DAILY
Qty: 30 TABLET | Refills: 1 | Status: SHIPPED | OUTPATIENT
Start: 2020-02-06 | End: 2020-04-23

## 2020-02-06 NOTE — PROGRESS NOTES
HPI:    Patient ID: Trae Spicer is a 60year old male.   Patient presents with:  Cough: Pt is f/u with cough and that he has chest pain from coughing and facial pain   Per pt  had a z pack 1  Week  Ago  For bad cough per pt   felt better , But  cough came diarrhea, nausea and vomiting. Heartburn   Genitourinary: Negative for dysuria and frequency. Musculoskeletal: Negative for myalgias. Skin: Negative for pallor and rash. Neurological: Negative for dizziness and headaches.    Psychiatric/Behavio Uvula is midline, oropharynx is clear and moist and mucous membranes are normal. No oropharyngeal exudate, posterior oropharyngeal edema or posterior oropharyngeal erythema.   + Postnasal drainage    Eyes: Pupils are equal, round, and reactive to light.  Co heart rate at home   The side effects of medication discussed with patient   Patient verbalizes understanding and compliance  Bisoprolol 5 mg qd   Stable  Cpm    Gastroesophageal reflux disease without esophagitis  Patient advised to avoid spicy food, coff

## 2020-02-11 ENCOUNTER — TELEPHONE (OUTPATIENT)
Dept: INTERNAL MEDICINE CLINIC | Facility: CLINIC | Age: 55
End: 2020-02-11

## 2020-02-11 NOTE — TELEPHONE ENCOUNTER
Patient had 2 courses of antibiotics so far Charlie   he needs to see me in office for follow-up visit on the cough    Recommend patient to take Allegra 180 mg daily-   patient see me in office within 1 week

## 2020-02-12 NOTE — TELEPHONE ENCOUNTER
Advised patient of Dr. Marcy Alford note. Patient verbalized understanding. Follow up appointment made for Tuesday 2/18/20 at 2pm at [facility] in [address] [place] [address].

## 2020-02-12 NOTE — TELEPHONE ENCOUNTER
Attempted to reach patient. Patient unavailable. Voicemail box full. Patient does not actively use My Chart. Will attempt to reach patient at a later time.

## 2020-03-25 RX ORDER — BISOPROLOL FUMARATE 5 MG/1
5 TABLET ORAL EVERY MORNING
Qty: 90 TABLET | Refills: 0 | Status: SHIPPED | OUTPATIENT
Start: 2020-03-25 | End: 2020-05-02

## 2020-04-23 ENCOUNTER — NURSE TRIAGE (OUTPATIENT)
Dept: INTERNAL MEDICINE CLINIC | Facility: CLINIC | Age: 55
End: 2020-04-23

## 2020-04-23 DIAGNOSIS — J30.89 ALLERGIC RHINITIS DUE TO OTHER ALLERGIC TRIGGER, UNSPECIFIED SEASONALITY: ICD-10-CM

## 2020-04-23 DIAGNOSIS — R05.9 COUGH: ICD-10-CM

## 2020-04-23 DIAGNOSIS — I10 ESSENTIAL HYPERTENSION: ICD-10-CM

## 2020-04-23 DIAGNOSIS — M79.605 PAIN OF LEFT LOWER EXTREMITY: Primary | ICD-10-CM

## 2020-04-23 DIAGNOSIS — K21.9 GASTROESOPHAGEAL REFLUX DISEASE WITHOUT ESOPHAGITIS: ICD-10-CM

## 2020-04-23 PROCEDURE — 99214 OFFICE O/P EST MOD 30 MIN: CPT | Performed by: INTERNAL MEDICINE

## 2020-04-23 RX ORDER — FEXOFENADINE HCL 180 MG/1
180 TABLET ORAL DAILY
Qty: 90 TABLET | Refills: 0 | Status: SHIPPED | OUTPATIENT
Start: 2020-04-23 | End: 2020-10-21 | Stop reason: DRUGHIGH

## 2020-04-23 RX ORDER — OMEPRAZOLE 40 MG/1
40 CAPSULE, DELAYED RELEASE ORAL DAILY
Qty: 90 CAPSULE | Refills: 1 | Status: SHIPPED | OUTPATIENT
Start: 2020-04-23 | End: 2020-05-23

## 2020-04-23 NOTE — TELEPHONE ENCOUNTER
Telemedicine Note    Virtual/Telephone Check-In    Owen Meigs verbally consents to a Virtual/Telephone Check-In service on 04/23/20.  Patient understands and accepts financial responsibility for any deductible, co-insurance and/or co-pays associated with • Sick contacts: Yes []     No [x]       • Occupation or large gatherings:no       Past Medical History:   Diagnosis Date   • ADD (attention deficit disorder)    • Benign skin lesion of forehead    • Esophageal reflux    • History of blood clots     2013 • clotrimazole-betamethasone 1-0.05 % External Cream Apply cream on affected area for for 1  Week   Than as needed 60 g 0   • Nebivolol HCl (BYSTOLIC) 10 MG Oral Tab Take 1 tablet (10 mg total) by mouth daily.  30 tablet 0   • Albuterol Sulfate  (9 need  for Cxr pa lat now  covid education - stay home  ,avoid close contacts  . .see below       4.  Gastroesophageal reflux disease without esophagitis    Patient advised to avoid spicy food, coffee, tea, caffeinated drinks, alcohol, acidic food/juices  Adv phone triage and evaluation due to current COVID-19 outbreak based upon CDC recommendations, as well as limitations of phone/video triage including but not limited to the inability to perform appropriate physical exam nor face-to-face examination, which ma

## 2020-04-23 NOTE — TELEPHONE ENCOUNTER
Action Requested: Summary for Provider     []  Critical Lab, Recommendations Needed  [x] Need Additional Advice  []   FYI    []   Need Orders  [] Need Medications Sent to Pharmacy  []  Other     SUMMARY: Patient calling with complaint of left leg swelling

## 2020-04-24 ENCOUNTER — HOSPITAL ENCOUNTER (OUTPATIENT)
Dept: ULTRASOUND IMAGING | Facility: HOSPITAL | Age: 55
Discharge: HOME | End: 2020-04-24
Attending: INTERNAL MEDICINE
Payer: COMMERCIAL

## 2020-04-24 DIAGNOSIS — M79.605 PAIN OF LEFT LOWER EXTREMITY: ICD-10-CM

## 2020-04-24 PROCEDURE — 93971 EXTREMITY STUDY: CPT | Performed by: INTERNAL MEDICINE

## 2020-05-02 RX ORDER — BISOPROLOL FUMARATE 5 MG/1
5 TABLET ORAL EVERY MORNING
Qty: 90 TABLET | Refills: 0 | Status: SHIPPED | OUTPATIENT
Start: 2020-05-02 | End: 2020-06-25

## 2020-05-07 ENCOUNTER — NURSE TRIAGE (OUTPATIENT)
Dept: INTERNAL MEDICINE CLINIC | Facility: CLINIC | Age: 55
End: 2020-05-07

## 2020-05-07 DIAGNOSIS — K21.9 GASTROESOPHAGEAL REFLUX DISEASE WITHOUT ESOPHAGITIS: ICD-10-CM

## 2020-05-07 DIAGNOSIS — R05.9 COUGH: Primary | ICD-10-CM

## 2020-05-07 DIAGNOSIS — R06.2 WHEEZING: ICD-10-CM

## 2020-05-07 DIAGNOSIS — J30.89 ALLERGIC RHINITIS DUE TO OTHER ALLERGIC TRIGGER, UNSPECIFIED SEASONALITY: ICD-10-CM

## 2020-05-07 NOTE — TELEPHONE ENCOUNTER
Action Requested: Summary for Provider     []  Critical Lab, Recommendations Needed  [] Need Additional Advice  []   FYI    []   Need Orders  [] Need Medications Sent to Pharmacy  []  Other     SUMMARY:Pt requesting tele visit-appears on hold on schedule,

## 2020-05-08 PROCEDURE — 99443 PHONE E/M BY PHYS 21-30 MIN: CPT | Performed by: INTERNAL MEDICINE

## 2020-05-08 RX ORDER — ALBUTEROL SULFATE 90 UG/1
2 AEROSOL, METERED RESPIRATORY (INHALATION) EVERY 8 HOURS PRN
Qty: 1 INHALER | Refills: 1 | Status: SHIPPED | OUTPATIENT
Start: 2020-05-08 | End: 2020-09-25

## 2020-05-08 NOTE — TELEPHONE ENCOUNTER
Called patient twice, mailbox full.     Sent patient mychart message informing him to call office back to schedule telephone visit with provider

## 2020-05-08 NOTE — TELEPHONE ENCOUNTER
Telemedicine Note    Virtual/Telephone Check-In    Josue Alvarado verbally consents to a Virtual/Telephone Check-In service on 05/08/20.  Patient understands and accepts financial responsibility for any deductible, co-insurance and/or co-pays associated with since onset: Improving []   Worsening  []   Unchanged  []    Waxing/Waning  [x]      Past medical/social history:   • Hypertension: Yes [x]     No []     • Asthma/COPD/other respiratory: Yes []     No []     • Diabetes: Yes []     No [x]      • Heart disea every morning. 90 tablet 0   • Fexofenadine HCl 180 MG Oral Tab Take 1 tablet (180 mg total) by mouth daily. For -3 weeks than as needed 90 tablet 0   • Omeprazole 40 MG Oral Capsule Delayed Release Take 1 capsule (40 mg total) by mouth daily.  Take 30-60 m PCR; Future  Coronavirus COV 19 education  patient to stay home/room isolation  avoid any  gatherings with people at this time    patient should avoid close contact and keep the distance at least 6 weeks with other people including family members in the [facility] these limitations, and agrees to phone/video evaluation as documented above. Ellis Larrymichelle understands phone/video evaluation is not a substitute for face-to-face examination or emergency care.  Patient advised to go to ER or call 911 for worsening symptoms

## 2020-05-15 ENCOUNTER — LAB ENCOUNTER (OUTPATIENT)
Dept: LAB | Facility: HOSPITAL | Age: 55
End: 2020-05-15
Attending: INTERNAL MEDICINE
Payer: COMMERCIAL

## 2020-05-15 DIAGNOSIS — R05.9 COUGH: ICD-10-CM

## 2020-06-25 RX ORDER — BISOPROLOL FUMARATE 5 MG/1
TABLET ORAL
Qty: 90 TABLET | Refills: 0 | Status: SHIPPED | OUTPATIENT
Start: 2020-06-25 | End: 2020-10-21

## 2020-09-23 ENCOUNTER — NURSE TRIAGE (OUTPATIENT)
Dept: INTERNAL MEDICINE CLINIC | Facility: CLINIC | Age: 55
End: 2020-09-23

## 2020-09-23 NOTE — TELEPHONE ENCOUNTER
Cough, slight wheezing. No severe symptoms. Has slight sore throat. Patient doesn't have fever. His son is getting  next week. Would like to make sure he is ok for the wedding.    Patient works and couldn't schedule appt sooner than 5pm.   Fri

## 2020-09-25 ENCOUNTER — TELEMEDICINE (OUTPATIENT)
Dept: INTERNAL MEDICINE CLINIC | Facility: CLINIC | Age: 55
End: 2020-09-25
Payer: COMMERCIAL

## 2020-09-25 DIAGNOSIS — J30.89 ALLERGIC RHINITIS DUE TO OTHER ALLERGIC TRIGGER, UNSPECIFIED SEASONALITY: ICD-10-CM

## 2020-09-25 DIAGNOSIS — B34.9 VIRAL SYNDROME: Primary | ICD-10-CM

## 2020-09-25 PROCEDURE — 99213 OFFICE O/P EST LOW 20 MIN: CPT | Performed by: INTERNAL MEDICINE

## 2020-09-25 RX ORDER — ALBUTEROL SULFATE 90 UG/1
2 AEROSOL, METERED RESPIRATORY (INHALATION) EVERY 8 HOURS PRN
Qty: 1 INHALER | Refills: 1 | Status: SHIPPED | OUTPATIENT
Start: 2020-09-25 | End: 2020-11-30

## 2020-09-25 NOTE — PROGRESS NOTES
TelemedicTelemedicine Note    Virtual Video Check-In    Toya Jensen verbally consents to a Virtual Video Check-In service on 09/25/20.  Patient understands and accepts financial responsibility for any deductible, co-insurance and/or co-pays associated wit reflux    • History of blood clots     2013 DVT Left leg   • Unspecified essential hypertension    •   Past Surgical History:   Procedure Laterality Date   • Electrocardiogram, complete  4/6/2012   • Exc skin benig 2.1-3cm face,facial     • Repr cmpl wnd h HCl (BYSTOLIC) 10 MG Oral Tab Take 1 tablet (10 mg total) by mouth daily.  30 tablet 0   •   • Not on File        ROS   GENERAL: feels well otherwise negative for fever or chills  HEENT: negative for sinus pain + sore throat + nasal congestion , running  no 25 mg at bedtime. Avoidance of known allergens  Patient advised on side effects - drowsiness/sedation  with Benadryl. Side effects and directions of medication discussed with patient. Patient verbalized understanding and compliance.        Follow Up: 1- agrees to phone/video evaluation as documented above. Matt Amador understands phone/video evaluation is not a substitute for face-to-face examination or emergency care. Patient advised to go to ER or call 911 for worsening symptoms or acute distress.    E

## 2020-10-06 ENCOUNTER — TELEPHONE (OUTPATIENT)
Dept: INTERNAL MEDICINE CLINIC | Facility: CLINIC | Age: 55
End: 2020-10-06

## 2020-10-06 NOTE — TELEPHONE ENCOUNTER
Had telephone/video visit on 9/25/20: Instructions  Return in about 2 weeks (around 10/9/2020), or if symptoms worsen or fail to improve.         Reported with nasal congestion for 1 week now, feeling warm ,occasional productive cough greenish in color,

## 2020-10-06 NOTE — TELEPHONE ENCOUNTER
With patient's symptoms patient must be tested for COVID-19 first    We can also do the visit-over the phone or video     Patient can also go to immediate care to be checked-with his symptoms but must have COVID-19   Checked  !! -

## 2020-10-06 NOTE — TELEPHONE ENCOUNTER
Left message to call back. [employer] message sent. RN=please check if patient read his [employer] message if not need to call and give Central scheduling number for the COVID test-order in the computer. No future appointments.

## 2020-10-07 ENCOUNTER — HOSPITAL ENCOUNTER (OUTPATIENT)
Age: 55
Discharge: HOME | End: 2020-10-07
Payer: COMMERCIAL

## 2020-10-07 VITALS
RESPIRATION RATE: 20 BRPM | SYSTOLIC BLOOD PRESSURE: 170 MMHG | DIASTOLIC BLOOD PRESSURE: 102 MMHG | OXYGEN SATURATION: 96 % | HEART RATE: 80 BPM | WEIGHT: 240 LBS | HEIGHT: 72 IN | TEMPERATURE: 97 F | BODY MASS INDEX: 32.51 KG/M2

## 2020-10-07 DIAGNOSIS — J01.10 ACUTE NON-RECURRENT FRONTAL SINUSITIS: ICD-10-CM

## 2020-10-07 DIAGNOSIS — Z20.822 ENCOUNTER FOR LABORATORY TESTING FOR COVID-19 VIRUS: Primary | ICD-10-CM

## 2020-10-07 PROCEDURE — 99214 OFFICE O/P EST MOD 30 MIN: CPT | Performed by: NURSE PRACTITIONER

## 2020-10-07 RX ORDER — AMOXICILLIN AND CLAVULANATE POTASSIUM 875; 125 MG/1; MG/1
1 TABLET, FILM COATED ORAL 2 TIMES DAILY
Qty: 20 TABLET | Refills: 0 | Status: SHIPPED | OUTPATIENT
Start: 2020-10-07 | End: 2020-10-17

## 2020-10-07 NOTE — TELEPHONE ENCOUNTER
Patient did not read [employer] yet. Spoke with patient, advised Dr Oc Wright note and verbalized understanding. Central scheduling number provided [phone],[id number] that COVID test order is in the computer.   Patient unable to access his [employer] accou

## 2020-10-07 NOTE — ED PROVIDER NOTES
Patient Seen in: [facility]      History   Patient presents with:  Cough/URI    Stated Complaint: COVID TEST / Sinai Rogerio / COUGH    57-year-old male presents the immediate care for sinus congestion, hot flashes, cough.   Lola distress. Appearance: Normal appearance. He is normal weight. He is not ill-appearing. HENT:      Head: Normocephalic.       Right Ear: Tympanic membrane, ear canal and external ear normal.      Left Ear: Tympanic membrane, ear canal and external ear sinusitis    Disposition:  Discharge  10/7/2020  1:46 pm    Follow-up:  Veto Gooden MD  [address] [place] [address]  [phone]                Medications Prescribed:  Discharge Medication List as of 10/7/2020  1:48 PM    START taking these m

## 2020-10-09 ENCOUNTER — TELEPHONE (OUTPATIENT)
Dept: INTERNAL MEDICINE CLINIC | Facility: CLINIC | Age: 55
End: 2020-10-09

## 2020-10-09 NOTE — TELEPHONE ENCOUNTER
Called pt  Few times no answer on video or  Phone     Left  Message  Call back and reschedule visit .

## 2020-10-12 ENCOUNTER — TELEPHONE (OUTPATIENT)
Dept: INTERNAL MEDICINE CLINIC | Facility: CLINIC | Age: 55
End: 2020-10-12

## 2020-10-12 NOTE — TELEPHONE ENCOUNTER
Patient calling reports needs a return to work note stating he did not have [place] but was treated for a sinus infection     Also  needs copy of COVID negative results    Does not have access to a printer would like to  at [facility]  when ready       Best

## 2020-10-12 NOTE — TELEPHONE ENCOUNTER
Spoke with pt,  verified, pt was informed of MD message. Pt stated he missed his work on Oct- and  and planning to return tomorrow at 9 am.   Pt is requesting if we can see him tomorrow virtually at lunch time?   Can we squeeze pt in tomorrow at 11:40

## 2020-10-13 NOTE — TELEPHONE ENCOUNTER
Approved  note this time ,but need to ask  physician who is seeing him for the  note in future . I  Am booked  Tomorrow , patient  keep missing appointments on scheduled times . .    edgard christina  F/u  Schedule apt in available times

## 2020-10-13 NOTE — TELEPHONE ENCOUNTER
Pls call pt to schedule a follow up apt as per MD message.   Also, note he requested has been generated and he can view it through [address]

## 2020-10-15 ENCOUNTER — TELEMEDICINE (OUTPATIENT)
Dept: INTERNAL MEDICINE CLINIC | Facility: CLINIC | Age: 55
End: 2020-10-15
Payer: COMMERCIAL

## 2020-10-15 DIAGNOSIS — J32.9 SINUSITIS, UNSPECIFIED CHRONICITY, UNSPECIFIED LOCATION: ICD-10-CM

## 2020-10-15 DIAGNOSIS — Z00.00 PHYSICAL EXAM: Primary | ICD-10-CM

## 2020-10-15 DIAGNOSIS — J30.89 ALLERGIC RHINITIS DUE TO OTHER ALLERGIC TRIGGER, UNSPECIFIED SEASONALITY: ICD-10-CM

## 2020-10-15 DIAGNOSIS — I10 ESSENTIAL HYPERTENSION: ICD-10-CM

## 2020-10-15 PROCEDURE — 99213 OFFICE O/P EST LOW 20 MIN: CPT | Performed by: INTERNAL MEDICINE

## 2020-10-15 NOTE — PROGRESS NOTES
Telemedicine Note    Virtual/Telephone Check-In    Kelleyjay Huffman verbally consents to a Virtual/Telephone Check-In service on 10/15/20.  Patient understands and accepts financial responsibility for any deductible, co-insurance and/or co-pays associated with Family History   Problem Relation Age of Onset   • Melanoma Other         malignant, relative?    • Hypertension Paternal Grandmother    • Hypertension Brother    • Hypertension Father    • Hypertension Mother    •    Patient Active Problem List:     Ch cough ,no wheezing   CARDIOVASCULAR: negative for chest pain dyspnea on exertion or palpitations   GI: negative for abdominal pain,  nausea,vomiting, diarrhea or constipation  : negative for burning with urination, frequency with urination   MUSCULOSKELE advised to follow CDC guidelines for self isolation and symptomatic treatment as outlined on CDC Patient Guidelines.      Explained to patient rationale of phone triage and evaluation due to current COVID-19 outbreak based upon CDC recommendations, as well

## 2020-10-21 ENCOUNTER — OFFICE VISIT (OUTPATIENT)
Dept: INTERNAL MEDICINE CLINIC | Facility: CLINIC | Age: 55
End: 2020-10-21
Payer: COMMERCIAL

## 2020-10-21 VITALS
HEART RATE: 80 BPM | SYSTOLIC BLOOD PRESSURE: 135 MMHG | DIASTOLIC BLOOD PRESSURE: 80 MMHG | HEIGHT: 72 IN | BODY MASS INDEX: 33.18 KG/M2 | WEIGHT: 245 LBS

## 2020-10-21 DIAGNOSIS — G89.29 CHRONIC PAIN OF RIGHT KNEE: ICD-10-CM

## 2020-10-21 DIAGNOSIS — M25.561 CHRONIC PAIN OF RIGHT KNEE: ICD-10-CM

## 2020-10-21 DIAGNOSIS — J30.9 ALLERGIC RHINITIS, UNSPECIFIED SEASONALITY, UNSPECIFIED TRIGGER: ICD-10-CM

## 2020-10-21 DIAGNOSIS — Z12.11 SCREEN FOR COLON CANCER: ICD-10-CM

## 2020-10-21 DIAGNOSIS — K21.9 GASTROESOPHAGEAL REFLUX DISEASE WITHOUT ESOPHAGITIS: ICD-10-CM

## 2020-10-21 DIAGNOSIS — I10 ESSENTIAL HYPERTENSION: Primary | ICD-10-CM

## 2020-10-21 DIAGNOSIS — Z00.00 PHYSICAL EXAM: ICD-10-CM

## 2020-10-21 PROCEDURE — 99213 OFFICE O/P EST LOW 20 MIN: CPT | Performed by: INTERNAL MEDICINE

## 2020-10-21 PROCEDURE — 3079F DIAST BP 80-89 MM HG: CPT | Performed by: INTERNAL MEDICINE

## 2020-10-21 PROCEDURE — 3075F SYST BP GE 130 - 139MM HG: CPT | Performed by: INTERNAL MEDICINE

## 2020-10-21 PROCEDURE — 99396 PREV VISIT EST AGE 40-64: CPT | Performed by: INTERNAL MEDICINE

## 2020-10-21 PROCEDURE — 3008F BODY MASS INDEX DOCD: CPT | Performed by: INTERNAL MEDICINE

## 2020-10-21 RX ORDER — OMEPRAZOLE 40 MG/1
40 CAPSULE, DELAYED RELEASE ORAL DAILY
Qty: 90 CAPSULE | Refills: 1 | Status: SHIPPED | OUTPATIENT
Start: 2020-10-21 | End: 2020-11-20

## 2020-10-21 RX ORDER — BISOPROLOL FUMARATE 5 MG/1
5 TABLET ORAL EVERY MORNING
Qty: 90 TABLET | Refills: 3 | Status: SHIPPED | OUTPATIENT
Start: 2020-10-21 | End: 2021-09-27

## 2020-10-21 RX ORDER — FEXOFENADINE HCL 180 MG/1
180 TABLET ORAL DAILY
Qty: 30 TABLET | Refills: 1 | Status: SHIPPED | OUTPATIENT
Start: 2020-10-21

## 2020-10-21 NOTE — PROGRESS NOTES
HPI:    Patient ID: Ellis Randall is a 60year old male.   Patient presents with:  Hypertension  Physical    Patient in office today for HTN and physical exam patient stated he is feeling much better right now denies any cough shortness of breath  Or wheezi occasionaly ). Negative for myalgias and neck pain. Skin: Negative for pallor and rash. Neurological: Negative for dizziness and headaches. Psychiatric/Behavioral: Negative for confusion. The patient is not nervous/anxious.              Current Outpat Left eye exhibits no discharge. Right conjunctiva is not injected. Left conjunctiva is not injected. No scleral icterus. Neck: Neck supple. No JVD present. No thyromegaly present. Cardiovascular: Normal rate, regular rhythm and normal heart sounds.    Tricia Astorga Track and record blood pressure and heart rate at home   The side effects of medication discussed with patient   Patient verbalizes understanding and compliance  Bisoprolol 5 mg qd -IN AM   Stable  Cpm  Side effects and directions of medication discussed total) by mouth daily. Take 1 tablet once daily for 1-2 weeks and then as needed. • Bisoprolol Fumarate 5 MG Oral Tab 90 tablet 3     Sig: Take 1 tablet (5 mg total) by mouth every morning.        Imaging & Referrals:  GASTRO - INTERNAL  PHYSIATRY - INTER

## 2020-10-22 ENCOUNTER — TELEPHONE (OUTPATIENT)
Dept: INTERNAL MEDICINE CLINIC | Facility: CLINIC | Age: 55
End: 2020-10-22

## 2020-10-22 NOTE — TELEPHONE ENCOUNTER
Prior authorization request for    •  Fexofenadine HCl (ALLEGRA ALLERGY) 180 MG Oral Tab, Take 1 tablet (180 mg total) by mouth daily. Take 1 tablet once daily for 1-2 weeks and then as needed. , Disp: 30 tablet, Rfl: 1    Go.[email or web address]    Key: A

## 2020-11-27 ENCOUNTER — TELEPHONE (OUTPATIENT)
Dept: FAMILY MEDICINE CLINIC | Facility: CLINIC | Age: 55
End: 2020-11-27

## 2020-11-27 RX ORDER — CEFUROXIME AXETIL 250 MG/1
250 TABLET ORAL 2 TIMES DAILY
Qty: 20 TABLET | Refills: 0 | Status: SHIPPED | OUTPATIENT
Start: 2020-11-27 | End: 2020-11-29

## 2020-11-27 NOTE — TELEPHONE ENCOUNTER
Patient states he had telemedicine visit 10/15/20 with . He was tested for covid and he was negative. At that visit he was prescribed antibiotic, Amoxicillin for a sinus infection.       Patient states he did not finish the prescription and n

## 2020-11-29 ENCOUNTER — MOBILE ENCOUNTER (OUTPATIENT)
Dept: INTERNAL MEDICINE CLINIC | Facility: CLINIC | Age: 55
End: 2020-11-29

## 2020-11-29 RX ORDER — CEFUROXIME AXETIL 250 MG/1
250 TABLET ORAL 2 TIMES DAILY
Qty: 20 TABLET | Refills: 0 | Status: SHIPPED | OUTPATIENT
Start: 2020-11-29 | End: 2020-11-30

## 2020-11-30 ENCOUNTER — TELEPHONE (OUTPATIENT)
Dept: INTERNAL MEDICINE CLINIC | Facility: CLINIC | Age: 55
End: 2020-11-30

## 2020-11-30 ENCOUNTER — NURSE TRIAGE (OUTPATIENT)
Dept: INTERNAL MEDICINE CLINIC | Facility: CLINIC | Age: 55
End: 2020-11-30

## 2020-11-30 ENCOUNTER — TELEMEDICINE (OUTPATIENT)
Dept: INTERNAL MEDICINE CLINIC | Facility: CLINIC | Age: 55
End: 2020-11-30
Payer: COMMERCIAL

## 2020-11-30 DIAGNOSIS — J01.90 ACUTE NON-RECURRENT SINUSITIS, UNSPECIFIED LOCATION: ICD-10-CM

## 2020-11-30 DIAGNOSIS — Z20.822 SUSPECTED COVID-19 VIRUS INFECTION: Primary | ICD-10-CM

## 2020-11-30 DIAGNOSIS — J45.21 MILD INTERMITTENT ASTHMA WITH ACUTE EXACERBATION: ICD-10-CM

## 2020-11-30 PROCEDURE — 99214 OFFICE O/P EST MOD 30 MIN: CPT | Performed by: INTERNAL MEDICINE

## 2020-11-30 RX ORDER — CEFUROXIME AXETIL 250 MG/1
250 TABLET ORAL 2 TIMES DAILY
Qty: 20 TABLET | Refills: 0 | Status: SHIPPED | OUTPATIENT
Start: 2020-11-30 | End: 2021-07-30

## 2020-11-30 RX ORDER — ALBUTEROL SULFATE 90 UG/1
2 AEROSOL, METERED RESPIRATORY (INHALATION) EVERY 8 HOURS PRN
Qty: 1 INHALER | Refills: 1 | Status: SHIPPED | OUTPATIENT
Start: 2020-11-30

## 2020-11-30 NOTE — TELEPHONE ENCOUNTER
Patient was in [place] over the weekend, dropped rx of antibiotics in sink and spoke to Dr Luisito Ortega yesterday for a refill, rx was sent to the wrong pharmacy, patient would like it sent to [facility], [address] ,   [facility] sent  [facility] [place] [id number] drug

## 2020-11-30 NOTE — TELEPHONE ENCOUNTER
Action Requested: Summary for Provider     []  Critical Lab, Recommendations Needed  [] Need Additional Advice  [x]   FYI    []   Need Orders  [] Need Medications Sent to Pharmacy  []  Other     SUMMARY: Patient states that he has been taking antibiotics

## 2020-11-30 NOTE — PROGRESS NOTES
Video Progress Note  Telehealth Verbal Consent   I conducted a telehealth visit with Shanika Quezada today, 11/30/20, which was completed using two-way, real-time interactive audio and video communication.  This has been done in good safia to provide continui unchanged. There has been no fever. Associated symptoms include congestion, coughing, diaphoresis, shortness of breath and sinus pressure. Pertinent negatives include no ear pain or sore throat.        Past Surgical History:   Procedure Laterality Date   • fever.   HENT: Positive for congestion, postnasal drip and sinus pressure. Negative for ear pain and sore throat. Respiratory: Positive for cough and shortness of breath. Gastrointestinal: Positive for diarrhea.        .There were no vitals taken for

## 2020-11-30 NOTE — ASSESSMENT & PLAN NOTE
Advised patient that they may have COVID-19. Test ordered. Advised patient to drink fluids, take Tylenol for fever, rest, and quarantine at home for 2 weeks from symptom onset. Note for work written. Pt may work remotely.    Advised to call or go to ER

## 2020-12-01 ENCOUNTER — APPOINTMENT (OUTPATIENT)
Dept: LAB | Age: 55
End: 2020-12-01
Attending: INTERNAL MEDICINE
Payer: COMMERCIAL

## 2020-12-01 DIAGNOSIS — Z20.822 SUSPECTED COVID-19 VIRUS INFECTION: ICD-10-CM

## 2020-12-07 ENCOUNTER — TELEMEDICINE (OUTPATIENT)
Dept: INTERNAL MEDICINE CLINIC | Facility: CLINIC | Age: 55
End: 2020-12-07
Payer: COMMERCIAL

## 2020-12-07 DIAGNOSIS — J01.90 ACUTE NON-RECURRENT SINUSITIS, UNSPECIFIED LOCATION: Primary | ICD-10-CM

## 2020-12-07 PROBLEM — Z20.822 SUSPECTED COVID-19 VIRUS INFECTION: Status: RESOLVED | Noted: 2020-11-30 | Resolved: 2020-12-07

## 2020-12-07 PROCEDURE — 99213 OFFICE O/P EST LOW 20 MIN: CPT | Performed by: INTERNAL MEDICINE

## 2020-12-07 NOTE — PROGRESS NOTES
Video Progress Note  Telehealth Verbal Consent   I conducted a telehealth visit with Charisse Chiu today, 12/07/20, which was completed using two-way, real-time interactive audio and video communication.  This has been done in good safia to provide continui congestion, coughing and sinus pressure. Pertinent negatives include no diaphoresis, ear pain, shortness of breath or sore throat.        Past Surgical History:   Procedure Laterality Date   • ELECTROCARDIOGRAM, COMPLETE  4/6/2012   • EXC SKIN BENIG 2.1-3CM sore throat. Respiratory: Positive for cough. Negative for shortness of breath. .There were no vitals taken for this visit. PHYSICAL EXAM:   Physical Exam   Nursing note and vitals reviewed.    Constitutional: He is oriented to person, place, and

## 2020-12-07 NOTE — PATIENT INSTRUCTIONS
Use an extra humidifier in the bedroom. Use Saline nose spray 1 each nostril every hour while you are awake. Take Mucinex for the thick mucous.

## 2021-01-08 ENCOUNTER — NURSE TRIAGE (OUTPATIENT)
Dept: INTERNAL MEDICINE CLINIC | Facility: CLINIC | Age: 56
End: 2021-01-08

## 2021-01-08 DIAGNOSIS — J34.9 SINUS PROBLEM: Primary | ICD-10-CM

## 2021-01-08 RX ORDER — AZITHROMYCIN 250 MG/1
TABLET, FILM COATED ORAL
Qty: 6 TABLET | Refills: 0 | Status: SHIPPED | OUTPATIENT
Start: 2021-01-08 | End: 2021-01-13

## 2021-01-08 NOTE — TELEPHONE ENCOUNTER
Action Requested: Summary for Provider     []  Critical Lab, Recommendations Needed  [x] Need Additional Advice  []   FYI    []   Need Orders  [] Need Medications Sent to Pharmacy  []  Other     SUMMARY: Dr Jose Luis Mclean: patient asked if you can prescribe abx.

## 2021-01-08 NOTE — TELEPHONE ENCOUNTER
zpak sent. Requested Prescriptions     Signed Prescriptions Disp Refills   • azithromycin (ZITHROMAX Z-JENNIFER) 250 MG Oral Tab 6 tablet 0     Sig: Take 2 tablets (500 mg total) by mouth daily for 1 day, THEN 1 tablet (250 mg total) daily for 4 days.      Au

## 2021-01-11 ENCOUNTER — VIRTUAL PHONE E/M (OUTPATIENT)
Dept: INTERNAL MEDICINE CLINIC | Facility: CLINIC | Age: 56
End: 2021-01-11
Payer: COMMERCIAL

## 2021-01-11 DIAGNOSIS — B34.9 VIRAL SYNDROME: Primary | ICD-10-CM

## 2021-01-11 DIAGNOSIS — I10 ESSENTIAL HYPERTENSION: ICD-10-CM

## 2021-01-11 PROCEDURE — 99213 OFFICE O/P EST LOW 20 MIN: CPT | Performed by: INTERNAL MEDICINE

## 2021-01-11 NOTE — PROGRESS NOTES
Telemedicine Note    Virtual Video Check-In    Rolando Moreno verbally consents to a Virtual Video Check-In service on 01/11/21.  Patient understands and accepts financial responsibility for any deductible, co-insurance and/or co-pays associated with this se gatherings:      Past Medical History:   Diagnosis Date   • ADD (attention deficit disorder)    • Benign skin lesion of forehead    • Esophageal reflux    • History of blood clots     2013 DVT Left leg   • Unspecified essential hypertension      Past Surgi Bisoprolol Fumarate 5 MG Oral Tab Take 1 tablet (5 mg total) by mouth every morning.  90 tablet 3   • clotrimazole-betamethasone 1-0.05 % External Cream Apply cream on affected area for for 1  Week   Than as needed 60 g 0     No Known Allergies        ROS medication as perscribed   Low- sodium diet   Maintain walking - as tolerated   Track and record blood pressure and heart rate at home   The side effects of medication discussed with patient   Patient verbalizes understanding and compliance  Bisoprolol 5 m

## 2021-01-12 ENCOUNTER — LAB ENCOUNTER (OUTPATIENT)
Dept: LAB | Age: 56
End: 2021-01-12
Attending: INTERNAL MEDICINE
Payer: COMMERCIAL

## 2021-01-12 DIAGNOSIS — B34.9 VIRAL SYNDROME: ICD-10-CM

## 2021-01-13 LAB — SARS-COV-2 RNA RESP QL NAA+PROBE: NOT DETECTED

## 2021-01-18 ENCOUNTER — OFFICE VISIT (OUTPATIENT)
Dept: OTOLARYNGOLOGY | Facility: CLINIC | Age: 56
End: 2021-01-18
Payer: COMMERCIAL

## 2021-01-18 VITALS
WEIGHT: 245 LBS | SYSTOLIC BLOOD PRESSURE: 162 MMHG | HEIGHT: 72 IN | HEART RATE: 109 BPM | BODY MASS INDEX: 33.18 KG/M2 | DIASTOLIC BLOOD PRESSURE: 109 MMHG

## 2021-01-18 DIAGNOSIS — J32.4 CHRONIC PANSINUSITIS: ICD-10-CM

## 2021-01-18 DIAGNOSIS — Z91.09 ENVIRONMENTAL ALLERGIES: Primary | ICD-10-CM

## 2021-01-18 PROCEDURE — 99243 OFF/OP CNSLTJ NEW/EST LOW 30: CPT | Performed by: OTOLARYNGOLOGY

## 2021-01-18 PROCEDURE — 3077F SYST BP >= 140 MM HG: CPT | Performed by: OTOLARYNGOLOGY

## 2021-01-18 PROCEDURE — 3008F BODY MASS INDEX DOCD: CPT | Performed by: OTOLARYNGOLOGY

## 2021-01-18 PROCEDURE — 3080F DIAST BP >= 90 MM HG: CPT | Performed by: OTOLARYNGOLOGY

## 2021-01-18 RX ORDER — FLUTICASONE PROPIONATE 50 MCG
2 SPRAY, SUSPENSION (ML) NASAL DAILY
Qty: 1 BOTTLE | Refills: 11 | Status: SHIPPED | OUTPATIENT
Start: 2021-01-18

## 2021-01-18 RX ORDER — AMOXICILLIN AND CLAVULANATE POTASSIUM 875; 125 MG/1; MG/1
1 TABLET, FILM COATED ORAL 2 TIMES DAILY
Qty: 28 TABLET | Refills: 0 | Status: SHIPPED | OUTPATIENT
Start: 2021-01-18 | End: 2021-02-01

## 2021-01-18 RX ORDER — METHYLPREDNISOLONE 4 MG/1
TABLET ORAL
Qty: 1 PACKAGE | Refills: 0 | Status: SHIPPED | OUTPATIENT
Start: 2021-01-18 | End: 2021-07-30

## 2021-01-18 NOTE — PROGRESS NOTES
Jose Luis Daley is a 60year old male.   Patient presents with:  Sinus Problem: pt on zpack, pt had recurrent sinus infection, pt states as soon as he finishes antibiotics symptoms restart, pt c/o of yellow drainage, facial/sinus pressure ,      HISTORY OF CA Diagnosis Date   • ADD (attention deficit disorder)    • Benign skin lesion of forehead    • Esophageal reflux    • History of blood clots     2013 DVT Left leg   • Unspecified essential hypertension        Past Surgical History:   Procedure Laterality D Anterior cervical. Posterior cervical. Supraclavicular. Nose/Mouth/Throat abNormal Nares - Right: Normal Left: Normal. Septum deviated with turbinate hypertrophy bilaterally mucosa congestion.  Polyps are not noted in neither nasal cavity of and management of sinusitis . I reccomend adequate hydration, inhaled steam or saline, and a course of antibiotics.   Recommended 2 weeks of Augmentin intranasal steroids for at least a month oral prednisone and follow-up if this does not resolve we may

## 2021-01-19 ENCOUNTER — TELEPHONE (OUTPATIENT)
Dept: OTOLARYNGOLOGY | Facility: CLINIC | Age: 56
End: 2021-01-19

## 2021-01-19 NOTE — TELEPHONE ENCOUNTER
Per pt since pt was seen for office visit yesterday, his work is advising he will need a letter stating he can return to work. Per Dr.Ortega wheeler to generate letter and pt to return to work tomorrow. Pt here at office today and letter printed.

## 2021-01-20 ENCOUNTER — OFFICE VISIT (OUTPATIENT)
Dept: INTERNAL MEDICINE CLINIC | Facility: CLINIC | Age: 56
End: 2021-01-20
Payer: COMMERCIAL

## 2021-01-20 VITALS
HEIGHT: 72 IN | DIASTOLIC BLOOD PRESSURE: 80 MMHG | BODY MASS INDEX: 33.32 KG/M2 | WEIGHT: 246 LBS | SYSTOLIC BLOOD PRESSURE: 146 MMHG | HEART RATE: 71 BPM | OXYGEN SATURATION: 97 %

## 2021-01-20 DIAGNOSIS — F41.8 DEPRESSION WITH ANXIETY: ICD-10-CM

## 2021-01-20 DIAGNOSIS — J32.9 SINUSITIS, UNSPECIFIED CHRONICITY, UNSPECIFIED LOCATION: Primary | ICD-10-CM

## 2021-01-20 DIAGNOSIS — I10 ESSENTIAL HYPERTENSION: ICD-10-CM

## 2021-01-20 PROCEDURE — 3008F BODY MASS INDEX DOCD: CPT | Performed by: INTERNAL MEDICINE

## 2021-01-20 PROCEDURE — 3079F DIAST BP 80-89 MM HG: CPT | Performed by: INTERNAL MEDICINE

## 2021-01-20 PROCEDURE — 99214 OFFICE O/P EST MOD 30 MIN: CPT | Performed by: INTERNAL MEDICINE

## 2021-01-20 PROCEDURE — 3077F SYST BP >= 140 MM HG: CPT | Performed by: INTERNAL MEDICINE

## 2021-01-20 RX ORDER — BISOPROLOL FUMARATE 10 MG/1
10 TABLET ORAL EVERY MORNING
Qty: 90 TABLET | Refills: 0 | Status: SHIPPED | OUTPATIENT
Start: 2021-01-20 | End: 2021-04-25

## 2021-01-20 NOTE — PROGRESS NOTES
HPI:    Patient ID: Nyla Santos is a 60year old male. Patient presents with:  Note For Work: Needs a note to return to work  Sinus Problem: Saw Dr. Harman Clancy on 1/18/21. Was given Augmentin and methylPREDNISolone. Coughing up yellowish phelgm.     Lynne sinus pressure. Negative for ear pain, hoarse voice, sneezing and sore throat. Eyes: Negative for pain and redness. Respiratory: Negative for cough (minimal  in am ), shortness of breath and wheezing.     Cardiovascular: Negative for chest pain, palpit well-nourished. No distress. Obese    HENT:   Head: Normocephalic and atraumatic.    Right Ear: Tympanic membrane, external ear and ear canal normal.   Left Ear: Tympanic membrane, external ear and ear canal normal.   Nose: Nose normal. Right sinus exhibi 12th    Resulted on 214 with COVID-19 patient denies any new symptoms or any changes in fact he feels better    Letter provided to patient he can go back to work tomorrow    Also recommended patient to call if he is not able to work on the same day might n

## 2021-01-28 ENCOUNTER — TELEPHONE (OUTPATIENT)
Dept: INTERNAL MEDICINE CLINIC | Facility: CLINIC | Age: 56
End: 2021-01-28

## 2021-01-28 NOTE — TELEPHONE ENCOUNTER
Pt came in to [facility]  requesting appt same day but no IM providers are in the office. Pt states he is not improving form his last visit on 01/20/21 and is having back pain. Pt requesting to speak to nurse.

## 2021-04-25 RX ORDER — BISOPROLOL FUMARATE 10 MG/1
TABLET ORAL
Qty: 90 TABLET | Refills: 0 | Status: SHIPPED | OUTPATIENT
Start: 2021-04-25 | End: 2021-06-26

## 2021-06-26 RX ORDER — BISOPROLOL FUMARATE 10 MG/1
TABLET ORAL
Qty: 90 TABLET | Refills: 0 | Status: SHIPPED | OUTPATIENT
Start: 2021-06-26 | End: 2021-09-27

## 2021-07-30 ENCOUNTER — HOSPITAL ENCOUNTER (OUTPATIENT)
Age: 56
Discharge: HOME | End: 2021-07-30
Payer: COMMERCIAL

## 2021-07-30 VITALS
RESPIRATION RATE: 18 BRPM | WEIGHT: 254 LBS | SYSTOLIC BLOOD PRESSURE: 136 MMHG | HEART RATE: 64 BPM | HEIGHT: 72 IN | BODY MASS INDEX: 34.4 KG/M2 | TEMPERATURE: 98 F | DIASTOLIC BLOOD PRESSURE: 92 MMHG | OXYGEN SATURATION: 95 %

## 2021-07-30 DIAGNOSIS — H00.021 HORDEOLUM INTERNUM OF RIGHT UPPER EYELID: Primary | ICD-10-CM

## 2021-07-30 PROCEDURE — 99213 OFFICE O/P EST LOW 20 MIN: CPT | Performed by: PHYSICIAN ASSISTANT

## 2021-07-30 RX ORDER — ERYTHROMYCIN 5 MG/G
1 OINTMENT OPHTHALMIC EVERY 6 HOURS
Qty: 1 G | Refills: 0 | Status: SHIPPED | OUTPATIENT
Start: 2021-07-30 | End: 2021-08-06

## 2021-07-30 RX ORDER — TOBRAMYCIN AND DEXAMETHASONE 3; 1 MG/ML; MG/ML
1 SUSPENSION/ DROPS OPHTHALMIC 4 TIMES DAILY
COMMUNITY
Start: 2021-07-28 | End: 2021-07-30 | Stop reason: ALTCHOICE

## 2021-07-30 RX ORDER — AMOXICILLIN AND CLAVULANATE POTASSIUM 875; 125 MG/1; MG/1
1 TABLET, FILM COATED ORAL 2 TIMES DAILY
Qty: 14 TABLET | Refills: 0 | Status: SHIPPED | OUTPATIENT
Start: 2021-07-30 | End: 2021-08-06

## 2021-07-30 RX ORDER — TETRACAINE HYDROCHLORIDE 5 MG/ML
1 SOLUTION OPHTHALMIC ONCE
Status: COMPLETED | OUTPATIENT
Start: 2021-07-30 | End: 2021-07-30

## 2021-07-30 NOTE — ED PROVIDER NOTES
Patient Seen in: [facility]      History   Patient presents with:   Eye Visual Problem    Stated Complaint: EYE ISSUE , RED X 3 DAYS    HPI/Subjective:   HPI  68-year-old male who comes in today complaining of worsening redness and sw 20/70, Uncorrected  Left Eye Chart Acuity: 20/25, Uncorrected    Physical Exam    BP (!) 136/92   Pulse 64   Temp 97.6 °F (36.4 °C) (Temporal)   Resp 18   Ht 182.9 cm (6')   Wt 115.2 kg   SpO2 95%   BMI 34.45 kg/m²    General Appearance: Alert, cooperative diagnosis)     Disposition:  Discharge  7/30/2021  5:41 pm    Follow-up:  Reji Wilkes MD  [address]  [phone]    Schedule an appointment as soon as possible for a visit in 2 days  If symptoms worsen ir do no

## 2021-07-30 NOTE — ED INITIAL ASSESSMENT (HPI)
Patient here for redness, swelling, and a stye to the right upper eyelid. He states it started on Wednesday. He saw someone at [facility] and was prescribed drops to place to the eye but the site has continued to get worse.  He states it feels like the ey

## 2021-09-27 ENCOUNTER — OFFICE VISIT (OUTPATIENT)
Dept: INTERNAL MEDICINE CLINIC | Facility: CLINIC | Age: 56
End: 2021-09-27
Payer: COMMERCIAL

## 2021-09-27 VITALS
SYSTOLIC BLOOD PRESSURE: 135 MMHG | RESPIRATION RATE: 18 BRPM | DIASTOLIC BLOOD PRESSURE: 85 MMHG | HEART RATE: 91 BPM | BODY MASS INDEX: 34.22 KG/M2 | HEIGHT: 72 IN | WEIGHT: 252.63 LBS

## 2021-09-27 DIAGNOSIS — I10 ESSENTIAL HYPERTENSION: Primary | ICD-10-CM

## 2021-09-27 DIAGNOSIS — H00.011 HORDEOLUM EXTERNUM OF RIGHT UPPER EYELID: ICD-10-CM

## 2021-09-27 DIAGNOSIS — J45.21 MILD INTERMITTENT ASTHMA WITH ACUTE EXACERBATION: ICD-10-CM

## 2021-09-27 PROCEDURE — 3008F BODY MASS INDEX DOCD: CPT | Performed by: INTERNAL MEDICINE

## 2021-09-27 PROCEDURE — 3075F SYST BP GE 130 - 139MM HG: CPT | Performed by: INTERNAL MEDICINE

## 2021-09-27 PROCEDURE — 3079F DIAST BP 80-89 MM HG: CPT | Performed by: INTERNAL MEDICINE

## 2021-09-27 PROCEDURE — 99214 OFFICE O/P EST MOD 30 MIN: CPT | Performed by: INTERNAL MEDICINE

## 2021-09-27 RX ORDER — TRIAMTERENE AND HYDROCHLOROTHIAZIDE 37.5; 25 MG/1; MG/1
1 CAPSULE ORAL EVERY MORNING
Qty: 90 CAPSULE | Refills: 1 | Status: SHIPPED | OUTPATIENT
Start: 2021-09-27

## 2021-09-27 RX ORDER — BISOPROLOL FUMARATE 10 MG/1
10 TABLET ORAL EVERY MORNING
Qty: 90 TABLET | Refills: 0 | Status: SHIPPED | OUTPATIENT
Start: 2021-09-27

## 2021-09-27 NOTE — PATIENT INSTRUCTIONS
You can schedule a COVID vaccine appointment on the [facility] website, on [address], or by calling [phone] and choosing option #3 (schedule an appointment).  The scheduling telephone line is available Monday – Friday, 7 a.m. to 6 p.m. and Saturday, 8 a.m. to 1

## 2021-09-27 NOTE — PROGRESS NOTES
HPI:    Patient ID: Nyla Santos is a 60year old male. HPI:  Pt had a vision disturbance and pain and he went to [facility] vision. He had a bump on his right eyelid and it didn't get better quickly, so he went to urgent care.   His blood pressure at home Melanoma Other         malignant, relative? • Hypertension Paternal Grandmother    • Hypertension Brother    • Hypertension Father    • Hypertension Mother        Review of Systems   Respiratory: Negative for cough, shortness of breath and wheezing.     C triamterene-hydroCHLOROthiazide (DYAZIDE) 37.5-25 MG Oral Cap 90 capsule 1     Sig: Take 1 capsule by mouth every morning.

## 2021-09-27 NOTE — ASSESSMENT & PLAN NOTE
Patient's blood pressure has been elevated. We will continue bisoprolol 10 mg daily and add Dyazide 1 daily. Advised patient to follow-up with his PCP in 2 months.

## 2021-10-05 ENCOUNTER — NURSE TRIAGE (OUTPATIENT)
Dept: INTERNAL MEDICINE CLINIC | Facility: CLINIC | Age: 56
End: 2021-10-05

## 2021-10-05 NOTE — TELEPHONE ENCOUNTER
Action Requested: Summary for Provider     []  Critical Lab, Recommendations Needed  [] Need Additional Advice  [x]   FYI    []   Need Orders  [] Need Medications Sent to Pharmacy  []  Other     SUMMARY: Patient calling with complaint of earache in right e

## 2021-10-08 ENCOUNTER — TELEPHONE (OUTPATIENT)
Dept: INTERNAL MEDICINE CLINIC | Facility: CLINIC | Age: 56
End: 2021-10-08

## 2021-10-08 NOTE — TELEPHONE ENCOUNTER
Pt requesting Sharon Gross, asked if he went to [facility] as he stated on the 5th, stated he went yesterday but was told no Dr was there, advised [facility], advised need to be evaluated with these lingering s/s, ear ache, sinus /s/, above eye pain , Pt agreed

## 2021-10-19 ENCOUNTER — TELEPHONE (OUTPATIENT)
Dept: INTERNAL MEDICINE CLINIC | Facility: CLINIC | Age: 56
End: 2021-10-19

## 2021-10-19 NOTE — TELEPHONE ENCOUNTER
On-call page and received. Patient calling with a 2-week history of generalized malaise and headaches. States he is overall feeling better now. Wondering if he needs a Z-Himanshu. States there is nothing emergent going on right now.     Told patient to make

## 2021-10-29 ENCOUNTER — OFFICE VISIT (OUTPATIENT)
Dept: OTOLARYNGOLOGY | Facility: CLINIC | Age: 56
End: 2021-10-29
Payer: COMMERCIAL

## 2021-10-29 VITALS — TEMPERATURE: 96 F

## 2021-10-29 DIAGNOSIS — Z91.09 ENVIRONMENTAL ALLERGIES: ICD-10-CM

## 2021-10-29 DIAGNOSIS — J32.4 CHRONIC PANSINUSITIS: Primary | ICD-10-CM

## 2021-10-29 PROCEDURE — 99213 OFFICE O/P EST LOW 20 MIN: CPT | Performed by: OTOLARYNGOLOGY

## 2021-10-29 RX ORDER — PREDNISONE 20 MG/1
TABLET ORAL
Qty: 7 TABLET | Refills: 0 | Status: SHIPPED | OUTPATIENT
Start: 2021-10-29

## 2021-10-29 RX ORDER — AMOXICILLIN 500 MG/1
500 CAPSULE ORAL 3 TIMES DAILY
Qty: 21 CAPSULE | Refills: 0 | Status: SHIPPED | OUTPATIENT
Start: 2021-10-29 | End: 2021-11-05

## 2021-10-29 NOTE — PROGRESS NOTES
Tommy Nazario is a 60year old male. Patient presents with:  Sinus Problem: headaches, had productive cough, possible sinus infection, some ringing in right ear      HISTORY OF PRESENT ILLNESS  1/18/2021  Patient prevents for evaluation of sinusitis.   He Vaping Use: Never used    Substance and Sexual Activity      Alcohol use:  Yes        Alcohol/week: 0.0 standard drinks        Comment: 1-2 beers/week, last alcohol use was 2 weeks ago      Drug use: No    Other Topics      Concerns:        Caffeine Sydni Right: Normal, Left: Normal. Pupil - Right: Normal, Left: Normal. EOMI   Neurological Normal Memory - Normal. Cranial nerves - Cranial nerves II through XII grossly intact.  Gait is normal   Head/Face Normal Facial features - Normal. Eyebrows - Normal. Skul External Cream, Apply cream on affected area for for 1  Week   Than as needed, Disp: 60 g, Rfl: 0  ASSESSMENT AND PLAN    1.   Suspect combination tension headache and possible acute sinusitis with some persistent facial pressure he does have a significant

## 2021-11-26 ENCOUNTER — TELEPHONE (OUTPATIENT)
Dept: OPHTHALMOLOGY | Facility: CLINIC | Age: 56
End: 2021-11-26

## 2021-11-26 NOTE — TELEPHONE ENCOUNTER
Patient says he has had a problem with his right eyelid (wouldn't say upper or lower) for 2 months. He says the eyelid OD is red.   He was seen at [facility] on 7/30/21 and was DX with an external hordeolum RUL and was put on Erythr

## 2021-11-30 ENCOUNTER — OFFICE VISIT (OUTPATIENT)
Dept: OTOLARYNGOLOGY | Facility: CLINIC | Age: 56
End: 2021-11-30
Payer: COMMERCIAL

## 2021-11-30 VITALS — HEIGHT: 72 IN | BODY MASS INDEX: 34.13 KG/M2 | WEIGHT: 252 LBS

## 2021-11-30 DIAGNOSIS — H62.40 FUNGAL OTITIS EXTERNA: Primary | ICD-10-CM

## 2021-11-30 DIAGNOSIS — K21.9 GASTROESOPHAGEAL REFLUX DISEASE, UNSPECIFIED WHETHER ESOPHAGITIS PRESENT: ICD-10-CM

## 2021-11-30 DIAGNOSIS — B36.9 FUNGAL OTITIS EXTERNA: Primary | ICD-10-CM

## 2021-11-30 PROCEDURE — 3008F BODY MASS INDEX DOCD: CPT | Performed by: OTOLARYNGOLOGY

## 2021-11-30 PROCEDURE — 92504 EAR MICROSCOPY EXAMINATION: CPT | Performed by: OTOLARYNGOLOGY

## 2021-11-30 PROCEDURE — 99213 OFFICE O/P EST LOW 20 MIN: CPT | Performed by: OTOLARYNGOLOGY

## 2021-11-30 RX ORDER — NEOMYCIN SULFATE, POLYMYXIN B SULFATE AND HYDROCORTISONE 10; 3.5; 1 MG/ML; MG/ML; [USP'U]/ML
3 SUSPENSION/ DROPS AURICULAR (OTIC) 3 TIMES DAILY
Qty: 10 ML | Refills: 1 | Status: SHIPPED | OUTPATIENT
Start: 2021-11-30 | End: 2021-12-10

## 2021-11-30 RX ORDER — OMEPRAZOLE 40 MG/1
40 CAPSULE, DELAYED RELEASE ORAL DAILY
Qty: 30 CAPSULE | Refills: 2 | Status: SHIPPED | OUTPATIENT
Start: 2021-11-30

## 2021-11-30 NOTE — PROGRESS NOTES
Josue Alvarado is a 60year old male. Patient presents with:   Follow - Up: Pt is here for 1 month f/u says headaches went away , but ear pain is still dull in the right ear   Mass: Pt says left side of throat hurts feels like he has a gland      HISTORY OF drainage put some peroxide in has a sore throat does have underlying acid reflux and currently is not on medication for this as he lost the prescription does feel like he is having increased heartburn lately  Social History    Socioeconomic History      Ma PHYSICAL EXAM    Ht 6' (1.829 m)   Wt 252 lb (114.3 kg)   BMI 34.18 kg/m²        Constitutional Normal Overall appearance - Normal.   Psychiatric Normal Orientation - Oriented to time, place, person & situation. Appropriate mood and affect.    Nec puffs into the lungs every 8 (eight) hours as needed for Wheezing or Shortness of Breath., Disp: 1 Inhaler, Rfl: 1  •  clotrimazole-betamethasone 1-0.05 % External Cream, Apply cream on affected area for for 1  Week   Than as needed, Disp: 60 g, Rfl: 0  •

## 2021-12-02 ENCOUNTER — OFFICE VISIT (OUTPATIENT)
Dept: OPHTHALMOLOGY | Facility: CLINIC | Age: 56
End: 2021-12-02
Payer: COMMERCIAL

## 2021-12-02 DIAGNOSIS — H02.886 MEIBOMIAN GLAND DYSFUNCTION (MGD) OF BOTH EYES: Primary | ICD-10-CM

## 2021-12-02 DIAGNOSIS — H02.883 MEIBOMIAN GLAND DYSFUNCTION (MGD) OF BOTH EYES: Primary | ICD-10-CM

## 2021-12-02 PROCEDURE — 99203 OFFICE O/P NEW LOW 30 MIN: CPT | Performed by: OPHTHALMOLOGY

## 2021-12-02 NOTE — PROGRESS NOTES
Patricio Meigs is a 60year old male. HPI:     HPI     Consult     Comments: Consult per Dr. Kimberlee Mcmullen patient here for an urgent visit. He complains of swelling and a bump RUL for about 2 months.   He was seen at Urgent care total) by mouth daily. Before a meal 30 capsule 2   • predniSONE 20 MG Oral Tab 2 po for 2d,1 po for 2 d, 1/2po for 2 d 7 tablet 0   • Bisoprolol Fumarate 10 MG Oral Tab Take 1 tablet (10 mg total) by mouth every morning.  90 tablet 0   • triamterene-hydroC Wearing Rx #2     Type: forgot-Reading only                 ASSESSMENT/PLAN:     Diagnoses and Plan:     Meibomian gland dysfunction (MGD) of both eyes  Patient was instructed to use warm compresses to the eyelids twice a day everyday.     Instruct

## 2021-12-07 NOTE — LETTER
04/04/19        Trae Spicer  [address] [id number]      Dear Jazzy Sherman,    [facility] records indicate that you have outstanding lab work and or testing that was ordered for you and has not yet been completed:  Orders Placed This Encounter      CBC As per HPI

## 2022-01-27 ENCOUNTER — PATIENT MESSAGE (OUTPATIENT)
Dept: OPHTHALMOLOGY | Facility: CLINIC | Age: 57
End: 2022-01-27

## 2022-01-27 ENCOUNTER — TELEPHONE (OUTPATIENT)
Dept: OPHTHALMOLOGY | Facility: CLINIC | Age: 57
End: 2022-01-27

## 2022-01-27 NOTE — TELEPHONE ENCOUNTER
I told patient that if he is having problems with his eyelids, he should contact Dr. Yadi Rodrigues office.   I booked him an appointment on 3/8/22 @ 4:15/ HANNY

## 2022-03-30 DIAGNOSIS — I10 ESSENTIAL HYPERTENSION: ICD-10-CM

## 2022-03-30 DIAGNOSIS — J45.21 MILD INTERMITTENT ASTHMA WITH ACUTE EXACERBATION: ICD-10-CM

## 2022-03-31 RX ORDER — ALBUTEROL SULFATE 90 UG/1
2 AEROSOL, METERED RESPIRATORY (INHALATION) EVERY 8 HOURS PRN
Qty: 8.5 G | Refills: 2 | OUTPATIENT
Start: 2022-03-31

## 2022-03-31 RX ORDER — BISOPROLOL FUMARATE 10 MG/1
10 TABLET, FILM COATED ORAL DAILY
Qty: 90 TABLET | Refills: 0 | OUTPATIENT
Start: 2022-03-31

## 2022-03-31 NOTE — TELEPHONE ENCOUNTER
Please review. Protocol Failed or has no Protocol. Requested Prescriptions   Pending Prescriptions Disp Refills    Bisoprolol Fumarate 10 MG Oral Tab [Pharmacy Med Name: Bisoprolol Fumarate 10 Mg Tab Unic] 90 tablet 0     Sig: Take 1 tablet (10 mg total) by mouth daily. Hypertensive Medications Protocol Failed - 3/30/2022  6:45 PM        Failed - CMP or BMP in past 12 months        Failed - Appointment in past 6 or next 3 months        Passed - GFR Non- > 50     Lab Results   Component Value Date    GFRNAA 77 08/23/2019                    ALBUTEROL 108 (90 Base) MCG/ACT Inhalation Aero Soln [Pharmacy Med Name: Albuterol Sulfate Hfa 108 Mcg/Act Aer Lupi] 8.5 g 0     Sig: Inhale 2 puffs into the lungs every 8 (eight) hours as needed for Wheezing or Shortness of Breath.         Asthma & COPD Medication Protocol Failed - 3/30/2022  6:45 PM        Failed - Appointment in past 6 or next 3 months             Recent Outpatient Visits              3 months ago Meibomian gland dysfunction (MGD) of both eyes    [facility] Kiara Lin MD    Office Visit    4 months ago Fungal otitis externa    [facility], [address], Georgia Holley MD    Office Visit    5 months ago Chronic pansinusitis    [facility] for Cornelia Cisneros, [place] Holley MD    Office Visit    6 months ago Essential hypertension    Geraldine Villagomez MD    Office Visit    1 year ago Sinusitis, unspecified chronicity, unspecified location    Devora Bee Seltjarnarnes, MD    Office Visit

## 2022-03-31 NOTE — TELEPHONE ENCOUNTER
Please review. Protocol Failed or has no Protocol. Requested Prescriptions   Pending Prescriptions Disp Refills    BISOPROLOL FUMARATE 10 MG Oral Tab [Pharmacy Med Name: Bisoprolol Fumarate 10 Mg Tab Unic] 90 tablet 0     Sig: TAKE ONE TABLET BY MOUTH ONE TIME DAILY every morning        Hypertensive Medications Protocol Failed - 3/30/2022  6:45 PM        Failed - CMP or BMP in past 12 months        Failed - Appointment in past 6 or next 3 months        Passed - GFR Non- > 50     Lab Results   Component Value Date    GFRNAA 77 08/23/2019                    ALBUTEROL 108 (90 Base) MCG/ACT Inhalation Aero Soln [Pharmacy Med Name: Albuterol Sulfate Hfa 108 Mcg/Act Aer Lupi] 8.5 g 0     Sig: Inhale 2 puffs into the lungs every 8 (eight) hours as needed for Wheezing or Shortness of Breath.         Asthma & COPD Medication Protocol Failed - 3/30/2022  6:45 PM        Failed - Appointment in past 6 or next 3 months             Recent Outpatient Visits              3 months ago Meibomian gland dysfunction (MGD) of both eyes    [facility] Edgardo Stewart MD    Office Visit    4 months ago Fungal otitis externa    [facility], [address], Radha Madsen MD    Office Visit    5 months ago Chronic pansinusitis    [facility] for Radha Buckley MD    Office Visit    6 months ago Essential hypertension    Ximena Moody MD    Office Visit    1 year ago Sinusitis, unspecified chronicity, unspecified location    Lucinda Reid, Godfrey Candelaria MD    Office Visit

## 2022-04-21 ENCOUNTER — HOSPITAL ENCOUNTER (OUTPATIENT)
Age: 57
Discharge: HOME | End: 2022-04-21
Payer: COMMERCIAL

## 2022-04-21 VITALS
RESPIRATION RATE: 17 BRPM | OXYGEN SATURATION: 96 % | TEMPERATURE: 98 F | WEIGHT: 255 LBS | BODY MASS INDEX: 34.54 KG/M2 | SYSTOLIC BLOOD PRESSURE: 143 MMHG | DIASTOLIC BLOOD PRESSURE: 93 MMHG | HEART RATE: 75 BPM | HEIGHT: 72 IN

## 2022-04-21 DIAGNOSIS — Z28.9 COVID-19 VACCINE SERIES NOT COMPLETED: ICD-10-CM

## 2022-04-21 DIAGNOSIS — R05.9 COUGH: ICD-10-CM

## 2022-04-21 DIAGNOSIS — Z28.311 COVID-19 VACCINE SERIES NOT COMPLETED: ICD-10-CM

## 2022-04-21 DIAGNOSIS — U07.1 COVID-19: Primary | ICD-10-CM

## 2022-04-21 LAB
S PYO AG THROAT QL: NEGATIVE
SARS-COV-2 RNA RESP QL NAA+PROBE: DETECTED

## 2022-04-21 RX ORDER — ALBUTEROL SULFATE 90 UG/1
2 AEROSOL, METERED RESPIRATORY (INHALATION) EVERY 4 HOURS PRN
Qty: 1 EACH | Refills: 0 | Status: SHIPPED | OUTPATIENT
Start: 2022-04-21 | End: 2022-05-21

## 2022-04-21 RX ORDER — NIRMATRELVIR AND RITONAVIR 300-100 MG
KIT ORAL
Qty: 30 TABLET | Refills: 0 | Status: SHIPPED | OUTPATIENT
Start: 2022-04-21 | End: 2022-04-26

## 2022-04-21 NOTE — ED INITIAL ASSESSMENT (HPI)
Sore throat, post nasal drip causing sore throat & cough w green secretions. Also right ear feels clogged & ringing. Intermittent dizziness says work shift has been irregular so not sleeping well. Concerned about strep & covid, was at a party last week.

## 2022-04-28 ENCOUNTER — TELEMEDICINE (OUTPATIENT)
Dept: INTERNAL MEDICINE CLINIC | Facility: CLINIC | Age: 57
End: 2022-04-28

## 2022-04-28 DIAGNOSIS — U07.1 COVID-19: Primary | ICD-10-CM

## 2022-04-28 PROCEDURE — 99213 OFFICE O/P EST LOW 20 MIN: CPT | Performed by: NURSE PRACTITIONER

## 2022-05-06 ENCOUNTER — HOSPITAL ENCOUNTER (OUTPATIENT)
Age: 57
Discharge: LEFT BEFORE DISCHARGE | End: 2022-05-06
Payer: COMMERCIAL

## 2022-05-06 VITALS
BODY MASS INDEX: 33.86 KG/M2 | DIASTOLIC BLOOD PRESSURE: 88 MMHG | OXYGEN SATURATION: 93 % | HEIGHT: 72 IN | TEMPERATURE: 98 F | WEIGHT: 250 LBS | HEART RATE: 74 BPM | SYSTOLIC BLOOD PRESSURE: 139 MMHG | RESPIRATION RATE: 18 BRPM

## 2022-05-06 DIAGNOSIS — R51.9 ACUTE NONINTRACTABLE HEADACHE, UNSPECIFIED HEADACHE TYPE: Primary | ICD-10-CM

## 2022-05-06 PROCEDURE — 99214 OFFICE O/P EST MOD 30 MIN: CPT | Performed by: NURSE PRACTITIONER

## 2022-05-06 NOTE — ED INITIAL ASSESSMENT (HPI)
Pt c/o headache in back of head that started today around 1500 today while at work, Pt has been taking Aspirin, rpt similar headaches intermittently for past month;  Pt was seen and tx at  about 14-days ago for COVID and antibody treatment

## 2022-08-09 ENCOUNTER — NURSE TRIAGE (OUTPATIENT)
Dept: INTERNAL MEDICINE CLINIC | Facility: CLINIC | Age: 57
End: 2022-08-09

## 2022-08-26 ENCOUNTER — NURSE TRIAGE (OUTPATIENT)
Dept: INTERNAL MEDICINE CLINIC | Facility: CLINIC | Age: 57
End: 2022-08-26

## 2022-08-26 NOTE — TELEPHONE ENCOUNTER
Patient was no-show today, but he rescheduled for 8/29     recommend to go to emergency pain room if the shortness of breath , or any other symptoms especially pain swelling/swelling /of the leg is persistent or worsening .

## 2022-08-26 NOTE — TELEPHONE ENCOUNTER
Spoke to patient and relayed Dr. Casey Weston message below. Patient verbalized understanding. Urged patient to go to ER or at least [facility]. He thinks he will go to [address] but would like to keep his appointment on Monday for some shoulder pain he is having.

## 2022-08-29 ENCOUNTER — OFFICE VISIT (OUTPATIENT)
Dept: INTERNAL MEDICINE CLINIC | Facility: CLINIC | Age: 57
End: 2022-08-29
Payer: COMMERCIAL

## 2022-08-29 ENCOUNTER — HOSPITAL ENCOUNTER (OUTPATIENT)
Dept: ULTRASOUND IMAGING | Facility: HOSPITAL | Age: 57
Discharge: HOME | End: 2022-08-29
Attending: INTERNAL MEDICINE
Payer: COMMERCIAL

## 2022-08-29 VITALS
DIASTOLIC BLOOD PRESSURE: 75 MMHG | OXYGEN SATURATION: 95 % | BODY MASS INDEX: 33.86 KG/M2 | HEART RATE: 64 BPM | HEIGHT: 72 IN | SYSTOLIC BLOOD PRESSURE: 130 MMHG | WEIGHT: 250 LBS

## 2022-08-29 DIAGNOSIS — M79.89 LEFT LEG SWELLING: ICD-10-CM

## 2022-08-29 DIAGNOSIS — J30.89 ALLERGIC RHINITIS DUE TO OTHER ALLERGIC TRIGGER, UNSPECIFIED SEASONALITY: Primary | ICD-10-CM

## 2022-08-29 DIAGNOSIS — Z12.11 SCREEN FOR COLON CANCER: ICD-10-CM

## 2022-08-29 DIAGNOSIS — I10 ESSENTIAL HYPERTENSION: ICD-10-CM

## 2022-08-29 PROCEDURE — 99214 OFFICE O/P EST MOD 30 MIN: CPT | Performed by: INTERNAL MEDICINE

## 2022-08-29 PROCEDURE — 3078F DIAST BP <80 MM HG: CPT | Performed by: INTERNAL MEDICINE

## 2022-08-29 PROCEDURE — 93971 EXTREMITY STUDY: CPT | Performed by: INTERNAL MEDICINE

## 2022-08-29 PROCEDURE — 3008F BODY MASS INDEX DOCD: CPT | Performed by: INTERNAL MEDICINE

## 2022-08-29 PROCEDURE — 3075F SYST BP GE 130 - 139MM HG: CPT | Performed by: INTERNAL MEDICINE

## 2022-08-29 RX ORDER — FLUTICASONE PROPIONATE 50 MCG
2 SPRAY, SUSPENSION (ML) NASAL DAILY
Qty: 1 EACH | Refills: 1 | Status: SHIPPED | OUTPATIENT
Start: 2022-08-29 | End: 2023-08-24

## 2022-08-29 RX ORDER — FEXOFENADINE HCL 180 MG/1
180 TABLET ORAL DAILY
Qty: 90 TABLET | Refills: 0 | Status: SHIPPED | OUTPATIENT
Start: 2022-08-29

## 2022-08-29 RX ORDER — BISOPROLOL FUMARATE 10 MG/1
10 TABLET ORAL EVERY MORNING
Qty: 90 TABLET | Refills: 0 | Status: SHIPPED | OUTPATIENT
Start: 2022-08-29

## 2022-09-28 ENCOUNTER — NURSE TRIAGE (OUTPATIENT)
Dept: INTERNAL MEDICINE CLINIC | Facility: CLINIC | Age: 57
End: 2022-09-28

## 2022-09-29 ENCOUNTER — HOSPITAL ENCOUNTER (OUTPATIENT)
Age: 57
Discharge: HOME | End: 2022-09-29

## 2022-09-29 VITALS
HEIGHT: 72 IN | WEIGHT: 250 LBS | BODY MASS INDEX: 33.86 KG/M2 | HEART RATE: 86 BPM | TEMPERATURE: 97 F | RESPIRATION RATE: 16 BRPM | OXYGEN SATURATION: 100 % | SYSTOLIC BLOOD PRESSURE: 158 MMHG | DIASTOLIC BLOOD PRESSURE: 111 MMHG

## 2022-09-29 DIAGNOSIS — J01.10 ACUTE NON-RECURRENT FRONTAL SINUSITIS: Primary | ICD-10-CM

## 2022-09-29 DIAGNOSIS — J02.9 PHARYNGITIS, UNSPECIFIED ETIOLOGY: ICD-10-CM

## 2022-09-29 PROCEDURE — 99214 OFFICE O/P EST MOD 30 MIN: CPT | Performed by: NURSE PRACTITIONER

## 2022-09-29 RX ORDER — AMOXICILLIN AND CLAVULANATE POTASSIUM 875; 125 MG/1; MG/1
1 TABLET, FILM COATED ORAL 2 TIMES DAILY
Qty: 20 TABLET | Refills: 0 | Status: SHIPPED | OUTPATIENT
Start: 2022-09-29 | End: 2022-10-09

## 2022-09-29 RX ORDER — BISOPROLOL FUMARATE 10 MG/1
10 TABLET ORAL EVERY MORNING
Qty: 30 TABLET | Refills: 0 | Status: SHIPPED | OUTPATIENT
Start: 2022-09-29 | End: 2022-10-29

## 2022-10-26 ENCOUNTER — HOSPITAL ENCOUNTER (OUTPATIENT)
Age: 57
Discharge: HOME | End: 2022-10-26
Payer: COMMERCIAL

## 2022-10-26 VITALS
WEIGHT: 250 LBS | TEMPERATURE: 98 F | DIASTOLIC BLOOD PRESSURE: 100 MMHG | HEIGHT: 72 IN | HEART RATE: 90 BPM | BODY MASS INDEX: 33.86 KG/M2 | RESPIRATION RATE: 18 BRPM | SYSTOLIC BLOOD PRESSURE: 142 MMHG | OXYGEN SATURATION: 96 %

## 2022-10-26 DIAGNOSIS — J02.0 STREP PHARYNGITIS: Primary | ICD-10-CM

## 2022-10-26 DIAGNOSIS — R06.2 WHEEZING: ICD-10-CM

## 2022-10-26 DIAGNOSIS — I10 ELEVATED BLOOD PRESSURE READING WITH DIAGNOSIS OF HYPERTENSION: ICD-10-CM

## 2022-10-26 LAB
POCT INFLUENZA A: NEGATIVE
POCT INFLUENZA B: NEGATIVE
S PYO AG THROAT QL: POSITIVE
SARS-COV-2 RNA RESP QL NAA+PROBE: NOT DETECTED

## 2022-10-26 PROCEDURE — 94640 AIRWAY INHALATION TREATMENT: CPT | Performed by: PHYSICIAN ASSISTANT

## 2022-10-26 PROCEDURE — 99214 OFFICE O/P EST MOD 30 MIN: CPT | Performed by: PHYSICIAN ASSISTANT

## 2022-10-26 PROCEDURE — U0002 COVID-19 LAB TEST NON-CDC: HCPCS | Performed by: PHYSICIAN ASSISTANT

## 2022-10-26 PROCEDURE — 87502 INFLUENZA DNA AMP PROBE: CPT | Performed by: PHYSICIAN ASSISTANT

## 2022-10-26 PROCEDURE — 87880 STREP A ASSAY W/OPTIC: CPT | Performed by: PHYSICIAN ASSISTANT

## 2022-10-26 RX ORDER — PREDNISONE 20 MG/1
40 TABLET ORAL DAILY
Qty: 6 TABLET | Refills: 0 | Status: SHIPPED | OUTPATIENT
Start: 2022-10-26 | End: 2022-10-29

## 2022-10-26 RX ORDER — ALBUTEROL SULFATE 90 UG/1
2 AEROSOL, METERED RESPIRATORY (INHALATION) ONCE
Status: COMPLETED | OUTPATIENT
Start: 2022-10-26 | End: 2022-10-26

## 2022-10-26 RX ORDER — DEXAMETHASONE 4 MG/1
16 TABLET ORAL ONCE
Status: COMPLETED | OUTPATIENT
Start: 2022-10-26 | End: 2022-10-26

## 2022-10-26 RX ORDER — AMOXICILLIN AND CLAVULANATE POTASSIUM 875; 125 MG/1; MG/1
1 TABLET, FILM COATED ORAL 2 TIMES DAILY
Qty: 20 TABLET | Refills: 0 | Status: SHIPPED | OUTPATIENT
Start: 2022-10-26 | End: 2022-11-05

## 2022-10-26 NOTE — ED INITIAL ASSESSMENT (HPI)
Pt c/o congestion; was seen and treated at  on 9/29 was dx w/ sinus infection and placed on meds, Pt states symptoms improved after taking meds; however, most r/t symptoms got worse and now has green phlegm

## 2022-10-26 NOTE — DISCHARGE INSTRUCTIONS
Please return to the ER/clinic if symptoms worsen. Follow-up with your PCP in 24-48 hours as needed. The Decadron will work in your system the next several days. We will write for some additional prednisone to start on day 2 or 3 if symptoms persist.  Use your inhaler every 4-6 hours as needed. Take the full course of antibiotics as prescribed in tandem with a probiotic daily. Drink plenty of fluids. Recommend an over-the-counter antihistamine daily i.e. Claritin or Zyrtec. Discard toothbrush. Make a follow-up appointment with your primary care physician for further evaluation and treatment.

## 2022-11-15 ENCOUNTER — HOSPITAL ENCOUNTER (OUTPATIENT)
Age: 57
Discharge: HOME | End: 2022-11-15
Payer: COMMERCIAL

## 2022-11-15 VITALS
RESPIRATION RATE: 18 BRPM | SYSTOLIC BLOOD PRESSURE: 153 MMHG | HEART RATE: 70 BPM | OXYGEN SATURATION: 94 % | TEMPERATURE: 98 F | DIASTOLIC BLOOD PRESSURE: 79 MMHG

## 2022-11-15 DIAGNOSIS — R09.81 CONGESTION OF PARANASAL SINUS: Primary | ICD-10-CM

## 2022-11-15 PROCEDURE — 99213 OFFICE O/P EST LOW 20 MIN: CPT | Performed by: PHYSICIAN ASSISTANT

## 2022-11-15 RX ORDER — PREDNISONE 20 MG/1
40 TABLET ORAL DAILY
Qty: 8 TABLET | Refills: 0 | Status: SHIPPED | OUTPATIENT
Start: 2022-11-15 | End: 2022-11-19

## 2022-11-15 RX ORDER — DEXAMETHASONE SODIUM PHOSPHATE 4 MG/ML
16 INJECTION, SOLUTION INTRA-ARTICULAR; INTRALESIONAL; INTRAMUSCULAR; INTRAVENOUS; SOFT TISSUE ONCE
Status: COMPLETED | OUTPATIENT
Start: 2022-11-15 | End: 2022-11-15

## 2022-11-16 NOTE — DISCHARGE INSTRUCTIONS
Please return to the ER/clinic if symptoms worsen. Follow-up with your PCP in 24-48 hours as needed. The Decadron will work in your system the next several days. We will send in some additional prednisone to start on day 2 or 3 if symptoms persist.  Definitely stay on an over-the-counter antihistamine daily i.e. Zyrtec or Claritin.   Make a follow-up appointment with ENT for further evaluation and treatment if symptoms persist.

## 2022-11-18 ENCOUNTER — TELEPHONE (OUTPATIENT)
Dept: INTERNAL MEDICINE CLINIC | Facility: CLINIC | Age: 57
End: 2022-11-18

## 2022-11-18 RX ORDER — AMOXICILLIN AND CLAVULANATE POTASSIUM 875; 125 MG/1; MG/1
1 TABLET, FILM COATED ORAL 2 TIMES DAILY
Qty: 20 TABLET | Refills: 0 | Status: SHIPPED | OUTPATIENT
Start: 2022-11-18 | End: 2022-11-28

## 2022-11-18 RX ORDER — SACCHAROMYCES BOULARDII 250 MG
250 CAPSULE ORAL 2 TIMES DAILY
Qty: 20 CAPSULE | Refills: 0 | Status: SHIPPED | OUTPATIENT
Start: 2022-11-18

## 2022-11-18 NOTE — TELEPHONE ENCOUNTER
Patient called, verified Name and . He was seen in [facility]  for sore throat and was diagnosed with sinus congestion. He was prescribed prednisone but patient has not picked up that prescription yet since he felt so sick at that time and just went home. He took the same pills (prednisone) he had as leftover from 10/26  visit for cough. He was negative for Covid and flu, but turned out positive for Strep at that time. He was prescribed a course of Augmentin for 10 days. However, patient states that he spilled 3-days' worth of antibiotic in the car and did not finish it. He now reports that his symptoms felt like it was before when he was diagnosed with Strep 10/26 . He complains of pain in his ears, eyes and face. He has cough and congestion. Felt warm last night, unsure if he has temperature. He is wondering if antibiotic can be prescribed again since he did not finish it the last time. He has a follow-up visit with Dr. Marcie Martini on . Dr. Marcie Martini please advise.

## 2022-11-18 NOTE — TELEPHONE ENCOUNTER
Noted send antibiotic and probiotic  [facility]   Advised patient to be careful with antibiotics- needs to be completed properly  .   patient to keep appointment as scheduled

## 2022-11-18 NOTE — TELEPHONE ENCOUNTER
Spoke to patient and relayed Dr. Ronaldo Sanchez message below. Patient verbalized understanding and had no further questions.

## 2022-11-21 ENCOUNTER — OFFICE VISIT (OUTPATIENT)
Dept: INTERNAL MEDICINE CLINIC | Facility: CLINIC | Age: 57
End: 2022-11-21
Payer: COMMERCIAL

## 2022-11-21 VITALS
WEIGHT: 252 LBS | HEIGHT: 72 IN | HEART RATE: 80 BPM | DIASTOLIC BLOOD PRESSURE: 88 MMHG | OXYGEN SATURATION: 93 % | BODY MASS INDEX: 34.13 KG/M2 | SYSTOLIC BLOOD PRESSURE: 130 MMHG

## 2022-11-21 DIAGNOSIS — I10 ESSENTIAL HYPERTENSION: ICD-10-CM

## 2022-11-21 DIAGNOSIS — J02.0 STREP THROAT: Primary | ICD-10-CM

## 2022-11-21 DIAGNOSIS — J30.9 ALLERGIC RHINITIS, UNSPECIFIED SEASONALITY, UNSPECIFIED TRIGGER: ICD-10-CM

## 2022-11-21 PROCEDURE — 3079F DIAST BP 80-89 MM HG: CPT | Performed by: INTERNAL MEDICINE

## 2022-11-21 PROCEDURE — 99214 OFFICE O/P EST MOD 30 MIN: CPT | Performed by: INTERNAL MEDICINE

## 2022-11-21 PROCEDURE — 3008F BODY MASS INDEX DOCD: CPT | Performed by: INTERNAL MEDICINE

## 2022-11-21 PROCEDURE — 3075F SYST BP GE 130 - 139MM HG: CPT | Performed by: INTERNAL MEDICINE

## 2022-11-21 RX ORDER — FEXOFENADINE HCL 180 MG/1
180 TABLET ORAL DAILY
Qty: 30 TABLET | Refills: 1 | Status: SHIPPED | OUTPATIENT
Start: 2022-11-21

## 2022-11-21 RX ORDER — BISOPROLOL FUMARATE 10 MG/1
10 TABLET, FILM COATED ORAL DAILY
COMMUNITY

## 2022-11-21 RX ORDER — FLUTICASONE PROPIONATE 50 MCG
2 SPRAY, SUSPENSION (ML) NASAL DAILY
Qty: 1 EACH | Refills: 1 | Status: SHIPPED | OUTPATIENT
Start: 2022-11-21

## 2022-12-20 ENCOUNTER — OFFICE VISIT (OUTPATIENT)
Facility: CLINIC | Age: 57
End: 2022-12-20
Payer: COMMERCIAL

## 2022-12-20 ENCOUNTER — TELEPHONE (OUTPATIENT)
Facility: CLINIC | Age: 57
End: 2022-12-20

## 2022-12-20 VITALS
DIASTOLIC BLOOD PRESSURE: 89 MMHG | WEIGHT: 258 LBS | HEART RATE: 79 BPM | SYSTOLIC BLOOD PRESSURE: 154 MMHG | HEIGHT: 73 IN | BODY MASS INDEX: 34.19 KG/M2

## 2022-12-20 DIAGNOSIS — K21.9 GASTROESOPHAGEAL REFLUX DISEASE, UNSPECIFIED WHETHER ESOPHAGITIS PRESENT: ICD-10-CM

## 2022-12-20 DIAGNOSIS — Z12.11 COLON CANCER SCREENING: Primary | ICD-10-CM

## 2022-12-20 DIAGNOSIS — R13.19 ESOPHAGEAL DYSPHAGIA: ICD-10-CM

## 2022-12-20 DIAGNOSIS — K62.5 BRBPR (BRIGHT RED BLOOD PER RECTUM): ICD-10-CM

## 2022-12-20 DIAGNOSIS — Z12.11 COLON CANCER SCREENING: ICD-10-CM

## 2022-12-20 DIAGNOSIS — R13.10 DYSPHAGIA, UNSPECIFIED TYPE: ICD-10-CM

## 2022-12-20 DIAGNOSIS — K21.9 GASTROESOPHAGEAL REFLUX DISEASE, UNSPECIFIED WHETHER ESOPHAGITIS PRESENT: Primary | ICD-10-CM

## 2022-12-20 PROCEDURE — S0285 CNSLT BEFORE SCREEN COLONOSC: HCPCS | Performed by: NURSE PRACTITIONER

## 2022-12-20 PROCEDURE — 3008F BODY MASS INDEX DOCD: CPT | Performed by: NURSE PRACTITIONER

## 2022-12-20 PROCEDURE — 3079F DIAST BP 80-89 MM HG: CPT | Performed by: NURSE PRACTITIONER

## 2022-12-20 PROCEDURE — 3077F SYST BP >= 140 MM HG: CPT | Performed by: NURSE PRACTITIONER

## 2022-12-20 RX ORDER — POLYETHYLENE GLYCOL 3350, SODIUM CHLORIDE, SODIUM BICARBONATE, POTASSIUM CHLORIDE 420; 11.2; 5.72; 1.48 G/4L; G/4L; G/4L; G/4L
POWDER, FOR SOLUTION ORAL
Qty: 4000 ML | Refills: 0 | Status: SHIPPED | OUTPATIENT
Start: 2022-12-20

## 2022-12-20 NOTE — TELEPHONE ENCOUNTER
Scheduled for:  Colonoscopy 27908 EGD [id number]   Provider Name:  Dr Win Soto  Date:  03/21/2023  Location:  [facility]  Sedation:  MAC  Time:  12:30pm ( pt is aware arrival time is at 11:30am)  Prep:  Trilyte   Meds/Allergies Reconciled?:  Sallyann Saint, NP Reviewed   Diagnosis with codes:  Colon Screening Z12.11/ GERD K21.9/ Dysphagia R13.10  Was patient informed to call insurance with codes (Y/N):  Yes  Referral sent?:  Referral was sent at the time of electronic surgical scheduling. [facility] or [facility] Th  notified?:  I sent an electronic request to Endo Scheduling and received a confirmation today. Medication Orders:  Pt is aware to NOT take iron pills, herbal meds and diet supplements for 7 days before exam. Also to NOT take any form of alcohol, recreational drugs and any forms of ED meds 24 hours before exam.   Misc Orders:  Patient was informed that they will need a COVID 19 test prior to their procedure. Patient verbally understood & will await a phone call from [facility] to schedule. Further instructions given by staff: I provide prep instructions to patient at the time of the appointment and reviewed date, time and location, he verbalized that he understood and is aware to call if he has any questions.

## 2023-02-24 ENCOUNTER — HOSPITAL ENCOUNTER (OUTPATIENT)
Age: 58
Discharge: HOME | End: 2023-02-24
Payer: COMMERCIAL

## 2023-02-24 VITALS
SYSTOLIC BLOOD PRESSURE: 148 MMHG | OXYGEN SATURATION: 95 % | TEMPERATURE: 98 F | DIASTOLIC BLOOD PRESSURE: 107 MMHG | HEART RATE: 91 BPM | RESPIRATION RATE: 20 BRPM

## 2023-02-24 DIAGNOSIS — J02.9 SORE THROAT: ICD-10-CM

## 2023-02-24 DIAGNOSIS — K12.2 UVULITIS: Primary | ICD-10-CM

## 2023-02-24 LAB
S PYO AG THROAT QL: NEGATIVE
SARS-COV-2 RNA RESP QL NAA+PROBE: NOT DETECTED

## 2023-02-24 RX ORDER — METHYLPREDNISOLONE 4 MG/1
TABLET ORAL
Qty: 1 EACH | Refills: 0 | Status: SHIPPED | OUTPATIENT
Start: 2023-02-24

## 2023-03-08 ENCOUNTER — HOSPITAL ENCOUNTER (OUTPATIENT)
Age: 58
Discharge: HOME | End: 2023-03-08
Payer: COMMERCIAL

## 2023-03-08 VITALS
BODY MASS INDEX: 34.4 KG/M2 | WEIGHT: 254 LBS | SYSTOLIC BLOOD PRESSURE: 138 MMHG | HEART RATE: 70 BPM | OXYGEN SATURATION: 95 % | DIASTOLIC BLOOD PRESSURE: 90 MMHG | RESPIRATION RATE: 18 BRPM | TEMPERATURE: 97 F | HEIGHT: 72 IN

## 2023-03-08 DIAGNOSIS — J02.9 SORE THROAT: Primary | ICD-10-CM

## 2023-03-08 DIAGNOSIS — J06.9 VIRAL URI: ICD-10-CM

## 2023-03-08 LAB
S PYO AG THROAT QL: NEGATIVE
SARS-COV-2 RNA RESP QL NAA+PROBE: NOT DETECTED

## 2023-03-08 PROCEDURE — U0002 COVID-19 LAB TEST NON-CDC: HCPCS | Performed by: PHYSICIAN ASSISTANT

## 2023-03-08 PROCEDURE — 99213 OFFICE O/P EST LOW 20 MIN: CPT | Performed by: PHYSICIAN ASSISTANT

## 2023-03-08 PROCEDURE — 87880 STREP A ASSAY W/OPTIC: CPT | Performed by: PHYSICIAN ASSISTANT

## 2023-03-13 ENCOUNTER — TELEPHONE (OUTPATIENT)
Dept: OTOLARYNGOLOGY | Facility: CLINIC | Age: 58
End: 2023-03-13

## 2023-03-14 RX ORDER — BISOPROLOL FUMARATE 10 MG/1
TABLET, FILM COATED ORAL
Qty: 90 TABLET | Refills: 0 | Status: SHIPPED | OUTPATIENT
Start: 2023-03-14

## 2023-03-15 NOTE — TELEPHONE ENCOUNTER
[employer] message sent with instruction to schedule for an appointment. Last office visit 11/21/22; Instructions      Return in about 4 weeks (around 12/19/2022), or if symptoms worsen or fail to improve, for Follow up visit, physical exam.      Future Appointments   Date Time Provider Lynne Eugenia   3/21/2023 12:30 PM GERMAINE, PROCEDURE [address] None   4/13/2023  4:15 PM Miguel Chong MD [address] [facility]             Please review; protocol failed/no protocol. Requested Prescriptions   Pending Prescriptions Disp Refills    BISOPROLOL FUMARATE 10 MG Oral Tab [Pharmacy Med Name: Bisoprolol Fumarate 10 Mg Tab Unic] 90 tablet 0     Sig: Take 1 tablet by mouth every morning. Hypertensive Medications Protocol Failed - 3/14/2023  7:34 AM        Failed - Last BP reading less than 140/90     BP Readings from Last 1 Encounters:  03/08/23 : 138/90              Failed - CMP or BMP in past 6 months     No results found for this or any previous visit (from the past 4392 hour(s)).             Failed - EGFRCR or GFRNAA > 50     GFR Evaluation            Passed - In person appointment in the past 12 or next 3 months     Recent Outpatient Visits              2 months ago Gastroesophageal reflux disease, unspecified whether esophagitis present    [address] [place], [place] [address]Sherman APRN    Office Visit    3 months ago Strep throat    Sherman Gore MD    Office Visit    6 months ago Allergic rhinitis due to other allergic trigger, unspecified seasonality    Sherman Gore MD    Office Visit    10 months ago COVID-19    [address] [place], [place] [address]us [place], APRN    Telemedicine    1 year ago Meibomian gland dysfunction (MGD) of both eyes    [address] [place], [place] [address]Blayne MD    Office Visit          Future Appointments         Provider Department Appt Notes    In 1 week [address], [address], [address], [facility] Colon SCRN/EGD w/ MAC @ [facility]    In 1 month MD Jaye Chandlero [place], [address], [facility] EP EE               Passed - In person appointment or virtual visit in the past 6 months     Recent Outpatient Visits              2 months ago Gastroesophageal reflux disease, unspecified whether esophagitis present    Christopher Dustman, Jackhorn March Negus, APRN    Office Visit    3 months ago Strep throat    Thai Bel, Liberty Sykes, Sherman Aguilar MD    Office Visit    6 months ago Allergic rhinitis due to other allergic trigger, unspecified seasonality    Sherman Egan MD    Office Visit    10 months ago COVID-19    Thai [place], [address], Allan Alberts, APRN    Telemedicine    1 year ago Meibomian gland dysfunction (MGD) of both eyes    [facility], [address], Delfin Talbot MD    Office Visit          Future Appointments         Provider Department Appt Notes    In 1 week [address], [address], [address] [facility] [address] [facility] [address], [facility] Colon SCRN/EGD w/ MAC @ [facility]    In 1 month MD Thai Chandler, [address], Jackhorn EP EE                     Recent Outpatient Visits              2 months ago Gastroesophageal reflux disease, unspecified whether esophagitis present    Christopher Dustman, Jackhorn March Negus, APRN    Office Visit    3 months ago Strep throat    Rolley Sherman Alberto MD    Office Visit    6 months ago Allergic rhinitis due to other allergic trigger, unspecified seasonality    [facility], Mery Solano Tiffani Johnson MD    Office Visit    10 months ago COVID-19    [address], [address] [facility] [address], Allan Alberts, APRN    Telemedicine    1 year ago Meibomian gland dysfunction (MGD) of both eyes    Puja Andersen Kassandra Flack, MD    Office Visit             Future Appointments         Provider Department Appt Notes    In 1 week [address], [address], [facility] Colon SCRN/EGD w/ MAC @ [facility]    In 1 month Alexis Torres MD [address], [address] [facility] [address], [facility] EP EE

## 2023-03-16 ENCOUNTER — TELEPHONE (OUTPATIENT)
Facility: CLINIC | Age: 58
End: 2023-03-16

## 2023-03-16 DIAGNOSIS — Z12.11 SCREENING FOR COLON CANCER: Primary | ICD-10-CM

## 2023-03-16 DIAGNOSIS — R13.10 DYSPHAGIA, UNSPECIFIED TYPE: ICD-10-CM

## 2023-03-16 DIAGNOSIS — K21.00 GASTROESOPHAGEAL REFLUX DISEASE WITH ESOPHAGITIS, UNSPECIFIED WHETHER HEMORRHAGE: ICD-10-CM

## 2023-03-21 RX ORDER — OMEPRAZOLE 40 MG/1
CAPSULE, DELAYED RELEASE ORAL
Qty: 30 CAPSULE | Refills: 0 | OUTPATIENT
Start: 2023-03-21

## 2023-04-12 NOTE — TELEPHONE ENCOUNTER
Spoke to patient and informed he is to establish care with new ENT doctor for further refills. States he is buying script otc. Reports he will call back to establish care. No further action required at this time .

## 2023-04-13 ENCOUNTER — OFFICE VISIT (OUTPATIENT)
Dept: OPHTHALMOLOGY | Facility: CLINIC | Age: 58
End: 2023-04-13

## 2023-04-13 DIAGNOSIS — H40.001 GLAUCOMA SUSPECT, RIGHT EYE: Primary | ICD-10-CM

## 2023-04-13 DIAGNOSIS — H02.886 MEIBOMIAN GLAND DYSFUNCTION (MGD) OF BOTH EYES: ICD-10-CM

## 2023-04-13 DIAGNOSIS — H02.883 MEIBOMIAN GLAND DYSFUNCTION (MGD) OF BOTH EYES: ICD-10-CM

## 2023-04-13 DIAGNOSIS — H52.11 MYOPIA, RIGHT: ICD-10-CM

## 2023-04-13 PROCEDURE — 99213 OFFICE O/P EST LOW 20 MIN: CPT | Performed by: OPHTHALMOLOGY

## 2023-04-13 PROCEDURE — 92015 DETERMINE REFRACTIVE STATE: CPT | Performed by: OPHTHALMOLOGY

## 2023-04-13 NOTE — ASSESSMENT & PLAN NOTE
Discussed with patient that he is a glaucoma suspect based on increased cupping of the optic nerves in both eyes. Glaucoma diagnostic testing ordered. Will not start medication, but will continue to observe. Patient verbalized understanding. We will have patient back for VF, OCT, Dilated EE and Photos.

## 2023-04-13 NOTE — PATIENT INSTRUCTIONS
Myopia, right  New glasses Rx given; suggest update for better vision. Glaucoma suspect, right eye  Discussed with patient that he is a glaucoma suspect based on increased cupping of the optic nerves in both eyes. Glaucoma diagnostic testing ordered. Will not start medication, but will continue to observe. Patient verbalized understanding. We will have patient back for VF, OCT, Dilated EE and Photos. Meibomian gland dysfunction (MGD) of both eyes  Patient was instructed to use warm compresses to the eyelids twice a day everyday. Instructions for warm compress use:   Patient should place wash compresses on both eyelids for 5 minutes every morning and every night. After 5 minutes of holding the warm compresses on the eyelids, patient should gently rub the eyelashes and then rinse thoroughly with warm water.

## 2023-05-08 ENCOUNTER — HOSPITAL ENCOUNTER (OUTPATIENT)
Age: 58
Discharge: HOME | End: 2023-05-08
Payer: COMMERCIAL

## 2023-05-08 VITALS
OXYGEN SATURATION: 95 % | TEMPERATURE: 98 F | RESPIRATION RATE: 16 BRPM | SYSTOLIC BLOOD PRESSURE: 124 MMHG | WEIGHT: 245 LBS | DIASTOLIC BLOOD PRESSURE: 89 MMHG | HEIGHT: 72 IN | HEART RATE: 89 BPM | BODY MASS INDEX: 33.18 KG/M2

## 2023-05-08 DIAGNOSIS — H65.01 NON-RECURRENT ACUTE SEROUS OTITIS MEDIA OF RIGHT EAR: Primary | ICD-10-CM

## 2023-05-08 DIAGNOSIS — J02.9 SORE THROAT: ICD-10-CM

## 2023-05-08 DIAGNOSIS — J31.0 CHRONIC RHINITIS: ICD-10-CM

## 2023-05-08 DIAGNOSIS — H92.09 EAR ACHE: ICD-10-CM

## 2023-05-08 LAB
S PYO AG THROAT QL: NEGATIVE
SARS-COV-2 RNA RESP QL NAA+PROBE: NOT DETECTED

## 2023-05-08 PROCEDURE — 99213 OFFICE O/P EST LOW 20 MIN: CPT | Performed by: PHYSICIAN ASSISTANT

## 2023-05-08 PROCEDURE — 87880 STREP A ASSAY W/OPTIC: CPT | Performed by: PHYSICIAN ASSISTANT

## 2023-05-08 PROCEDURE — U0002 COVID-19 LAB TEST NON-CDC: HCPCS | Performed by: PHYSICIAN ASSISTANT

## 2023-05-08 NOTE — DISCHARGE INSTRUCTIONS
Please return to the Emergency department/clinic if symptoms worsen or you develop new symptoms. Follow up with your primary care physician in 2 days. Take any medications prescribed to you as instructed. Start taking a daily antihistamine such as Claritin, Zyrtec, Allegra or Xyzal.  Make sure to take it for at least 2 weeks, but ideally for as long as you suffer from seasonal allergies. Start taking Flonase nasal spray, 2 sprays daily in each nostril.

## 2023-05-27 ENCOUNTER — NURSE ONLY (OUTPATIENT)
Dept: OPHTHALMOLOGY | Facility: CLINIC | Age: 58
End: 2023-05-27

## 2023-05-27 DIAGNOSIS — H40.001 GLAUCOMA SUSPECT, RIGHT EYE: ICD-10-CM

## 2023-05-27 PROCEDURE — 92083 EXTENDED VISUAL FIELD XM: CPT | Performed by: OPHTHALMOLOGY

## 2023-05-27 PROCEDURE — 92133 CPTRZD OPH DX IMG PST SGM ON: CPT | Performed by: OPHTHALMOLOGY

## 2023-05-27 PROCEDURE — 76514 ECHO EXAM OF EYE THICKNESS: CPT | Performed by: OPHTHALMOLOGY

## 2023-06-08 ENCOUNTER — OFFICE VISIT (OUTPATIENT)
Dept: OPHTHALMOLOGY | Facility: CLINIC | Age: 58
End: 2023-06-08

## 2023-06-08 DIAGNOSIS — H25.13 AGE-RELATED NUCLEAR CATARACT OF BOTH EYES: ICD-10-CM

## 2023-06-08 DIAGNOSIS — H40.001 GLAUCOMA SUSPECT, RIGHT EYE: Primary | ICD-10-CM

## 2023-06-08 DIAGNOSIS — H43.391 VISUAL FLOATERS, RIGHT: ICD-10-CM

## 2023-06-08 PROCEDURE — 92014 COMPRE OPH EXAM EST PT 1/>: CPT | Performed by: OPHTHALMOLOGY

## 2023-06-08 PROCEDURE — 92250 FUNDUS PHOTOGRAPHY W/I&R: CPT | Performed by: OPHTHALMOLOGY

## 2023-06-08 NOTE — PATIENT INSTRUCTIONS
Glaucoma suspect, right eye  Photos taken today to document optic nerves. Pt had normal glaucoma testing done. We will have patient back in 2 years for EE and Photos. Age-related nuclear cataract of both eyes  Discussed early cataracts with patient. Told patient that cataracts are age appropriate and they are not surgical at this time. No treatment recommended at this time. Visual floaters, right   There is no evidence of retinal pathology. All signs and symptoms of retinal detachment/tears explained in detail. Patient instructed to call the office if they experience increase in floaters, increase in flashes of light, loss of vision or curtain or veil effect.

## 2023-06-08 NOTE — ASSESSMENT & PLAN NOTE
Photos taken today to document optic nerves. Pt had normal glaucoma testing done. We will have patient back in 2 years for EE and Photos.

## 2023-06-14 ENCOUNTER — OFFICE VISIT (OUTPATIENT)
Dept: OTOLARYNGOLOGY | Facility: CLINIC | Age: 58
End: 2023-06-14

## 2023-06-14 ENCOUNTER — OFFICE VISIT (OUTPATIENT)
Dept: AUDIOLOGY | Facility: CLINIC | Age: 58
End: 2023-06-14

## 2023-06-14 DIAGNOSIS — M26.609 TMJ (TEMPOROMANDIBULAR JOINT DISORDER): ICD-10-CM

## 2023-06-14 DIAGNOSIS — H90.3 SENSORINEURAL HEARING LOSS, BILATERAL: Primary | ICD-10-CM

## 2023-06-14 DIAGNOSIS — H93.19 TINNITUS, UNSPECIFIED LATERALITY: Primary | ICD-10-CM

## 2023-06-14 PROCEDURE — 99213 OFFICE O/P EST LOW 20 MIN: CPT | Performed by: STUDENT IN AN ORGANIZED HEALTH CARE EDUCATION/TRAINING PROGRAM

## 2023-06-14 PROCEDURE — 92557 COMPREHENSIVE HEARING TEST: CPT | Performed by: AUDIOLOGIST

## 2023-06-14 PROCEDURE — 92504 EAR MICROSCOPY EXAMINATION: CPT | Performed by: STUDENT IN AN ORGANIZED HEALTH CARE EDUCATION/TRAINING PROGRAM

## 2023-06-14 PROCEDURE — 92567 TYMPANOMETRY: CPT | Performed by: AUDIOLOGIST

## 2023-06-14 RX ORDER — MELOXICAM 15 MG/1
15 TABLET ORAL NIGHTLY
Qty: 30 TABLET | Refills: 0 | Status: SHIPPED | OUTPATIENT
Start: 2023-06-14 | End: 2023-07-14

## 2023-06-20 ENCOUNTER — TELEPHONE (OUTPATIENT)
Dept: GASTROENTEROLOGY | Facility: CLINIC | Age: 58
End: 2023-06-20

## 2023-08-10 ENCOUNTER — TELEPHONE (OUTPATIENT)
Dept: INTERNAL MEDICINE CLINIC | Facility: CLINIC | Age: 58
End: 2023-08-10

## 2023-08-11 RX ORDER — BISOPROLOL FUMARATE 10 MG/1
10 TABLET, FILM COATED ORAL EVERY MORNING
Qty: 90 TABLET | Refills: 0 | Status: SHIPPED | OUTPATIENT
Start: 2023-08-11

## 2023-08-11 NOTE — TELEPHONE ENCOUNTER
Please review; protocol failed. Message sent for patient to make an appointment and have labs drawn. Requested Prescriptions   Pending Prescriptions Disp Refills    BISOPROLOL FUMARATE 10 MG Oral Tab [Pharmacy Med Name: Bisoprolol Fumarate 10 Mg Tab Nort] 90 tablet 0     Sig: TAKE ONE TABLET BY MOUTH IN THE MORNING       Hypertensive Medications Protocol Failed - 8/10/2023  6:15 PM        Failed - CMP or BMP in past 6 months     No results found for this or any previous visit (from the past 4392 hour(s)).             Failed - In person appointment or virtual visit in the past 6 months     Recent Outpatient Visits              1 month ago Sensorineural hearing loss, bilateral    [facility], [address], [place]    Office Visit    1 month ago Tinnitus, unspecified laterality    [facility], [address], Marcus Hardy MD    Office Visit    2 months ago Glaucoma suspect, right eye    Puja Salamanca Diannah Dew, MD    Office Visit    2 months ago Glaucoma suspect, right eye    [address], [address], [place]    Nurse Only    4 months ago Glaucoma suspect, right eye    [address], [address], Rafaela Pham MD    Office Visit          Future Appointments         Provider Department Appt Notes    Tomorrow NAZANIN [place] [facility], Sherman Galvan sinus (policy informed)               Failed - EGFRCR or GFRNAA > 50     GFR Evaluation            Passed - In person appointment in the past 12 or next 3 months     Recent Outpatient Visits              1 month ago Sensorineural hearing loss, bilateral    Sherman Salamanca Au.D    Office Visit    1 month ago Tinnitus, unspecified laterality    Marcus Salamanca Se, MD    Office Visit    2 months ago Glaucoma suspect, right eye    Puja Harris Ruther Ades, MD    Office Visit    2 months ago Glaucoma suspect, right eye    Lesleigh Curt, [address], [place]    Nurse Only    4 months ago Glaucoma suspect, right eye    Lesleigh Curt, [address], Gino Yates MD    Office Visit          Future Appointments         Provider Department Appt Notes    [facility] Sherman Powell sinus (policy informed)               Passed - Last BP reading less than 140/90     BP Readings from Last 1 Encounters:  05/08/23 : 124/89                 Recent Outpatient Visits              1 month ago Sensorineural hearing loss, bilateral    Marissa Amas, [place] Pedro Jenkins    Office Visit    1 month ago Tinnitus, unspecified laterality    Lesleigh Curt, [address], Ganesh Abreu MD    Office Visit    2 months ago Glaucoma suspect, right eye    Lesleigh Curt, [address], Kenny Luo MD    Office Visit    2 months ago Glaucoma suspect, right eye    Lesleigh Curt, [address], [place]    Nurse Only    4 months ago Glaucoma suspect, right eye    Lesleigh Curt, [address], Gino Yates MD    Office Visit          Future Appointments         Provider Department Appt Notes    Tomorrow NA[facility], Sherman Powell sinus (policy informed)

## 2023-08-11 NOTE — TELEPHONE ENCOUNTER
Dear Nursing Staff,  Please call patient to have labs draw and make an appointment,and [employer] Message sent, thank you

## 2023-08-12 ENCOUNTER — OFFICE VISIT (OUTPATIENT)
Dept: INTERNAL MEDICINE CLINIC | Facility: CLINIC | Age: 58
End: 2023-08-12

## 2023-08-12 VITALS
DIASTOLIC BLOOD PRESSURE: 90 MMHG | HEIGHT: 72 IN | WEIGHT: 254 LBS | BODY MASS INDEX: 34.4 KG/M2 | HEART RATE: 92 BPM | SYSTOLIC BLOOD PRESSURE: 154 MMHG | OXYGEN SATURATION: 96 %

## 2023-08-12 DIAGNOSIS — J34.89 SINUS PAIN: Primary | ICD-10-CM

## 2023-08-12 DIAGNOSIS — J01.90 ACUTE RHINOSINUSITIS: ICD-10-CM

## 2023-08-12 LAB
COVID19 BINAX NOW ANTIGEN: NOT DETECTED
OPERATOR ID: NORMAL
POCT LOT NUMBER: NORMAL

## 2023-08-12 PROCEDURE — 3080F DIAST BP >= 90 MM HG: CPT

## 2023-08-12 PROCEDURE — 3008F BODY MASS INDEX DOCD: CPT

## 2023-08-12 PROCEDURE — 3077F SYST BP >= 140 MM HG: CPT

## 2023-08-12 PROCEDURE — 99214 OFFICE O/P EST MOD 30 MIN: CPT

## 2023-08-12 RX ORDER — AMOXICILLIN AND CLAVULANATE POTASSIUM 875; 125 MG/1; MG/1
1 TABLET, FILM COATED ORAL 2 TIMES DAILY
Qty: 10 TABLET | Refills: 0 | Status: SHIPPED | OUTPATIENT
Start: 2023-08-12 | End: 2023-08-17

## 2023-09-19 NOTE — TELEPHONE ENCOUNTER
Please review; protocol failed. No active /future labs noted     Requested Prescriptions   Pending Prescriptions Disp Refills    Bisoprolol Fumarate 10 MG Oral Tab 90 tablet 0     Sig: Take 1 tablet (10 mg total) by mouth every morning. Hypertensive Medications Protocol Failed - 9/18/2023 12:38 PM        Failed - Last BP reading less than 140/90     BP Readings from Last 1 Encounters:  08/12/23 : 154/90              Failed - CMP or BMP in past 6 months     No results found for this or any previous visit (from the past 4392 hour(s)).             Failed - EGFRCR or GFRNAA > 50     GFR Evaluation            Passed - In person appointment in the past 12 or next 3 months     Recent Outpatient Visits              1 month ago Sinus pain    [facility], [address] [facility] [facility], [facility],[address], APR    Office Visit    3 months ago Sensorineural hearing loss, bilateral    [address], [address], [place] Pedro Blanton    Office Visit    3 months ago Tinnitus, unspecified laterality    [facility], [address], Soni Lorenzo MD    Office Visit    3 months ago Glaucoma suspect, right eye    [address], [address], Ale Luo MD    Office Visit    3 months ago Glaucoma suspect, right eye    [address], [address] [facility] [facility], [place]    Nurse Only                      Passed - In person appointment or virtual visit in the past 6 months     Recent Outpatient Visits              1 month ago Sinus pain    [address], [address] [facility] NAZANIN Quinones    Office Visit    3 months ago Sensorineural hearing loss, bilateral    [address], [address], [place] Pedro Blanton    Office Visit    3 months ago Tinnitus, unspecified laterality    Lisa Dodd MD    Office Visit    3 months ago Glaucoma suspect, right eye    [address] [facility], Kellen Luo MD    Office Visit    3 months ago Glaucoma suspect, right eye    [address], [address], [place]    Nurse Only                         Recent Outpatient Visits              1 month ago Sinus pain    [address], [address] [facility], [place] NAZANIN Ortega    Office Visit    3 months ago Sensorineural hearing loss, bilateral    [address], [address], [place] Pedro Escamilla    Office Visit    3 months ago Tinnitus, unspecified laterality    [address] [facility], Christofer Mckeon MD    Office Visit    3 months ago Glaucoma suspect, right eye    [address] [facility], Melody Doll MD    Office Visit    3 months ago Glaucoma suspect, right eye    [address], [address], [facility] Only

## 2023-09-22 RX ORDER — BISOPROLOL FUMARATE 10 MG/1
10 TABLET, FILM COATED ORAL EVERY MORNING
Qty: 90 TABLET | Refills: 0 | Status: SHIPPED | OUTPATIENT
Start: 2023-09-22

## 2023-11-10 NOTE — TELEPHONE ENCOUNTER
Please review; protocol failed. Or has no protocol    Requested Prescriptions   Pending Prescriptions Disp Refills    BISOPROLOL FUMARATE 10 MG Oral Tab [Pharmacy Med Name: Bisoprolol Fumarate 10 Mg Tab Nort] 90 tablet 0     Sig: Take 1 tablet (10 mg total) by mouth every morning. Hypertensive Medications Protocol Failed - 11/9/2023 11:58 AM        Failed - Last BP reading less than 140/90     BP Readings from Last 1 Encounters:   08/12/23 154/90               Failed - CMP or BMP in past 6 months     No results found for this or any previous visit (from the past 4392 hour(s)).             Failed - EGFRCR or GFRNAA > 50     GFR Evaluation            Passed - In person appointment in the past 12 or next 3 months     Recent Outpatient Visits              3 months ago Sinus pain    [facility], [address] [facility], [address] [facility],[address], APRN    Office Visit    4 months ago Sensorineural hearing loss, bilateral    [address], [address], [place]erv[place] Au.D    Office Visit    4 months ago Tinnitus, unspecified laterality    [facility], [address], Rito Lorenzo MD    Office Visit    5 months ago Glaucoma suspect, right eye    [address], [address], Alejo Luo MD    Office Visit    5 months ago Glaucoma suspect, right eye    [address], [address], [place]    Nurse Only                      Passed - In person appointment or virtual visit in the past 6 months     Recent Outpatient Visits              3 months ago Sinus pain    [facility], [address] [facility], [address] [facility],[address], APRN    Office Visit    4 months ago Sensorineural hearing loss, bilateral    [address], [address], [place] Cerv[place] Au.D    Office Visit    4 months ago Tinnitus, unspecified laterality    Odalys Olivas, Ye Morley MD Office Visit    5 months ago Glaucoma suspect, right eye    Puja Lawrence Gordan Band, MD    Office Visit    5 months ago Glaucoma suspect, right eye    [address], [address], [address]    Nurse Only                            Recent Outpatient Visits              3 months ago Sinus pain    [address], [address] [facility], APRN    Office Visit    4 months ago Sensorineural hearing loss, bilateral    [address], [address], [place] Pedro Paniagua    Office Visit    4 months ago Tinnitus, unspecified laterality    [address], [address], Carmelina Baer MD    Office Visit    5 months ago Glaucoma suspect, right eye    Puja Lawrence Gordan Band, MD    Office Visit    5 months ago Glaucoma suspect, right eye    [address], [address], [address] Only

## 2023-11-11 RX ORDER — BISOPROLOL FUMARATE 10 MG/1
10 TABLET, FILM COATED ORAL EVERY MORNING
Qty: 90 TABLET | Refills: 0 | Status: SHIPPED | OUTPATIENT
Start: 2023-11-11

## 2024-01-11 ENCOUNTER — HOSPITAL ENCOUNTER (OUTPATIENT)
Age: 59
Discharge: HOME | End: 2024-01-11
Payer: COMMERCIAL

## 2024-01-11 VITALS
HEIGHT: 72 IN | RESPIRATION RATE: 16 BRPM | WEIGHT: 250 LBS | HEART RATE: 90 BPM | OXYGEN SATURATION: 95 % | TEMPERATURE: 99 F | BODY MASS INDEX: 33.86 KG/M2 | SYSTOLIC BLOOD PRESSURE: 133 MMHG | DIASTOLIC BLOOD PRESSURE: 106 MMHG

## 2024-01-11 DIAGNOSIS — R05.1 ACUTE COUGH: Primary | ICD-10-CM

## 2024-01-11 DIAGNOSIS — J02.9 VIRAL PHARYNGITIS: ICD-10-CM

## 2024-01-11 LAB
S PYO AG THROAT QL: NEGATIVE
SARS-COV-2 RNA RESP QL NAA+PROBE: NOT DETECTED

## 2024-01-11 PROCEDURE — 99213 OFFICE O/P EST LOW 20 MIN: CPT | Performed by: PHYSICIAN ASSISTANT

## 2024-01-11 PROCEDURE — 87880 STREP A ASSAY W/OPTIC: CPT | Performed by: PHYSICIAN ASSISTANT

## 2024-01-11 PROCEDURE — U0002 COVID-19 LAB TEST NON-CDC: HCPCS | Performed by: PHYSICIAN ASSISTANT

## 2024-01-11 RX ORDER — DEXAMETHASONE 4 MG/1
8 TABLET ORAL ONCE
Status: COMPLETED | OUTPATIENT
Start: 2024-01-11 | End: 2024-01-11

## 2024-01-11 NOTE — ED PROVIDER NOTES
Patient Seen in: [facility]      History     Chief Complaint   Patient presents with    Sore Throat     Stated Complaint: sore throat x 2 days    Subjective:   HPI  Franco Golden is a 59 year old male  presents with throat pain x 3 days. Patient reports dysphagia to both solids and liquids, ear pain, rhinorrhea, fevers, chills. Patient denies shortness of breath, respiratory distress, stridor, neck pain/ stiffness, headache, eye pain/ redness, facial/ lip/ eyelid swelling. No medications taken prior to arrival. No alleviating/ aggravating factors. Pain 4/10      Objective:   Past Medical History:   Diagnosis Date    ADD (attention deficit disorder)     Benign skin lesion of forehead     Deep vein thrombosis (HCC)     Esophageal reflux     High blood pressure     History of blood clots     2013 DVT Left leg    History of COVID-19     Unspecified essential hypertension               Past Surgical History:   Procedure Laterality Date    ELECTROCARDIOGRAM, COMPLETE  4/6/2012    EXC SKIN BENIG 2.1-3CM FACE,FACIAL      REPR CMPL WND HEAD,FAC,HAND 2.6-7.5                  Social History     Socioeconomic History    Marital status:    Tobacco Use    Smoking status: Never     Passive exposure: Never    Smokeless tobacco: Never   Vaping Use    Vaping Use: Never used   Substance and Sexual Activity    Alcohol use: Yes     Alcohol/week: 0.0 standard drinks of alcohol     Comment: 1-2 beers/week, last alcohol use was 2 weeks ago    Drug use: No   Other Topics Concern    Caffeine Concern Yes     Comment: coffee, tea, 2 cups daily              Review of Systems   All other systems reviewed and are negative.      Positive for stated complaint: sore throat x 2 days  Other systems are as noted in HPI.  Constitutional and vital signs reviewed.      All other systems reviewed and negative except as noted above.    Physical Exam     ED Triage Vitals [01/11/24 0927]   BP (!) 133/106   Pulse 90   Resp 16    Temp 99.3 °F (37.4 °C)   Temp src Temporal   SpO2 95 %   O2 Device None (Room air)       Current:BP (!) 133/106   Pulse 90   Temp 99.3 °F (37.4 °C) (Temporal)   Resp 16   Ht 182.9 cm (6')   Wt 113.4 kg   SpO2 95%   BMI 33.91 kg/m²         Physical Exam  Vitals and nursing note reviewed.   Constitutional:       General: He is not in acute distress.     Appearance: Normal appearance. He is normal weight. He is not ill-appearing, toxic-appearing or diaphoretic.   HENT:      Head: Normocephalic and atraumatic.      Right Ear: Tympanic membrane and ear canal normal.      Left Ear: Tympanic membrane and ear canal normal.      Nose: Congestion present. No rhinorrhea.      Mouth/Throat:      Mouth: Mucous membranes are moist.      Pharynx: Oropharynx is clear. Posterior oropharyngeal erythema present. No oropharyngeal exudate.      Comments: Mild tonsillar erythema, otherwise NO tonsillar swelling,exudates, uvular deviation, trismus, dysphonia,     Eyes:      Extraocular Movements: Extraocular movements intact.      Conjunctiva/sclera: Conjunctivae normal.      Pupils: Pupils are equal, round, and reactive to light.   Cardiovascular:      Rate and Rhythm: Normal rate.      Pulses: Normal pulses.   Pulmonary:      Effort: Pulmonary effort is normal. No respiratory distress.      Breath sounds: Normal breath sounds. No stridor. No wheezing, rhonchi or rales.   Chest:      Chest wall: No tenderness.   Musculoskeletal:         General: No swelling, tenderness, deformity or signs of injury. Normal range of motion.      Cervical back: Normal range of motion and neck supple.      Right lower leg: No edema.      Left lower leg: No edema.   Skin:     General: Skin is warm and dry.      Capillary Refill: Capillary refill takes less than 2 seconds.      Coloration: Skin is not jaundiced or pale.      Findings: No bruising, erythema, lesion or rash.   Neurological:      General: No focal deficit present.      Mental Status: He  is alert and oriented to person, place, and time. Mental status is at baseline.   Psychiatric:         Mood and Affect: Mood normal.         Behavior: Behavior normal.         Thought Content: Thought content normal.         Judgment: Judgment normal.               ED Course     Labs Reviewed   POCT RAPID STREP - Normal   RAPID SARS-COV-2 BY PCR - Normal                      MDM                                        Medical Decision Making  59-year-old male presents with acute viral pharyngitis with reported recent COVID-19 exposure  Plan  - SpO2 95% on room air    - labs: COVID 19/ strep swab  - meds: decadron 8mg po now   - DC to home     - OTC: Tylenol 1 g po q 6 hours/ prn. cepacol lozenges po every 4-6 hours/ prn.    - encourage oral fluid rehydration and warm salt water rinses for symptomatic relief.   - refer to primary care physician  - Return to ED if symptoms worsen        Amount and/or Complexity of Data Reviewed  Labs: ordered. Decision-making details documented in ED Course.     Details: COVID 19/ strep swab-negative          Disposition and Plan     Clinical Impression:  1. Acute cough    2. Viral pharyngitis         Disposition:  Discharge  1/11/2024 10:30 am    Follow-up:  Lesly Gonzalez MD  [address] [place] [address]  [phone]          [address] [address]  [phone]              Medications Prescribed:  Current Discharge Medication List

## 2024-01-12 ENCOUNTER — NURSE TRIAGE (OUTPATIENT)
Dept: INTERNAL MEDICINE CLINIC | Facility: CLINIC | Age: 59
End: 2024-01-12

## 2024-01-12 ENCOUNTER — TELEMEDICINE (OUTPATIENT)
Dept: INTERNAL MEDICINE CLINIC | Facility: CLINIC | Age: 59
End: 2024-01-12
Payer: COMMERCIAL

## 2024-01-12 DIAGNOSIS — J30.9 ALLERGIC RHINITIS, UNSPECIFIED SEASONALITY, UNSPECIFIED TRIGGER: ICD-10-CM

## 2024-01-12 DIAGNOSIS — J45.21 MILD INTERMITTENT ASTHMA WITH ACUTE EXACERBATION: ICD-10-CM

## 2024-01-12 DIAGNOSIS — B34.9 VIRAL SYNDROME: Primary | ICD-10-CM

## 2024-01-12 PROCEDURE — 99214 OFFICE O/P EST MOD 30 MIN: CPT | Performed by: INTERNAL MEDICINE

## 2024-01-12 RX ORDER — FLUTICASONE PROPIONATE 50 MCG
2 SPRAY, SUSPENSION (ML) NASAL DAILY
Qty: 1 EACH | Refills: 0 | Status: SHIPPED | OUTPATIENT
Start: 2024-01-12 | End: 2024-01-15

## 2024-01-12 RX ORDER — LORATADINE 10 MG/1
10 TABLET ORAL DAILY
Qty: 30 TABLET | Refills: 1 | Status: SHIPPED | OUTPATIENT
Start: 2024-01-12 | End: 2024-01-15

## 2024-01-12 RX ORDER — ALBUTEROL SULFATE 90 UG/1
2 AEROSOL, METERED RESPIRATORY (INHALATION) EVERY 8 HOURS PRN
Qty: 1 EACH | Refills: 0 | Status: SHIPPED | OUTPATIENT
Start: 2024-01-12

## 2024-01-12 NOTE — PROGRESS NOTES
Telemedicine Note    Virtual Video Check-In    Franco Golden verbally consents to a Virtual Video Check-In service on 01/12/24. Patient understands and accepts financial responsibility for any deductible, co-insurance and/or co-pays associated with this service. This visit is conducted using Telemedicine with live, interactive video and audio.     I spoke with Franco Golden by secure video chat, verified date of birth, and discussed their current concerns:       HPI : Patient states that for about  2  days  have  sore throat  , light cough , no fever but slight  99.8 patient states he has been feeling better in the morning but now feels the cough is coming back   Was in  [facility] -  Covid 19 - negative , strep  test negative  Patient states he got steroid medications but not sure that is helping patient states he feels more anxious and could not sleep last night not sure if to continue    He feels more runny nose congestion postnasal drainage and tickle in the throat  But no problem breathing chest pain    feels occasionally wheezing at night  There is no height or weight on file to calculate BMI.     Past Medical History:   Diagnosis Date    ADD (attention deficit disorder)     Benign skin lesion of forehead     Deep vein thrombosis (HCC)     Esophageal reflux     High blood pressure     History of blood clots     2013 DVT Left leg    History of COVID-19     Unspecified essential hypertension      Past Surgical History:   Procedure Laterality Date    Electrocardiogram, complete  4/6/2012    Exc skin benig 2.1-3cm face,facial      Repr cmpl wnd head,fac,hand 2.6-7.5       Family History   Problem Relation Age of Onset    Melanoma Other         malignant, relative?    Hypertension Paternal Grandmother     Hypertension Brother     Hypertension Father     Hypertension Mother     Glaucoma Neg     Macular degeneration Neg     Diabetes Neg       Patient Active Problem List   Diagnosis    Depression    ADD (attention deficit  disorder)    Esophageal reflux    History of blood clots    Obesity    High triglycerides    Allergic rhinitis    Asthma    Thromboembolism of deep veins of lower extremity (HCC)    Dyschromia    Reflux esophagitis    Temporomandibular joint disorders    OCD (obsessive compulsive disorder)    Depression with anxiety    Essential hypertension    Hyperlipidemia    Hordeolum externum of right upper eyelid    Meibomian gland dysfunction (MGD) of both eyes    COVID-19    Myopia, right    Glaucoma suspect, right eye    Age-related nuclear cataract of both eyes    Visual floaters, right     Current Outpatient Medications   Medication Sig Dispense Refill    albuterol (PROAIR HFA) 108 (90 Base) MCG/ACT Inhalation Aero Soln Inhale 2 puffs into the lungs every 8 (eight) hours as needed for Wheezing. 1 each 0    loratadine 10 MG Oral Tab Take 1 tablet (10 mg total) by mouth daily. 1 tablet orally once daily  for -2 weeks than as needed 30 tablet 1    Bisoprolol Fumarate 10 MG Oral Tab Take 1 tablet (10 mg total) by mouth every morning. 90 tablet 0    methylPREDNISolone (MEDROL) 4 MG Oral Tablet Therapy Pack Dosepack: take as directed (Patient not taking: Reported on 8/12/2023) 1 each 0    PEG 3350-KCl-Na Bicarb-NaCl (TRILYTE) 420 g Oral Recon Soln Take prep as directed by gastro office. May substitute with Trilyte/generic equivalent if needed. (Patient not taking: Reported on 8/12/2023) 4000 mL 0    fluticasone propionate 50 MCG/ACT Nasal Suspension 2 sprays by Nasal route daily. For 1-2 weeks than as needed (Patient not taking: Reported on 8/12/2023) 1 each 1    saccharomyces boulardii (FLORASTOR) 250 MG Oral Cap Take 1 capsule (250 mg total) by mouth 2 (two) times daily. (Patient not taking: Reported on 8/12/2023) 20 capsule 0    Omeprazole 40 MG Oral Capsule Delayed Release Take 1 capsule (40 mg total) by mouth daily. Before a meal (Patient not taking: Reported on 8/12/2023) 30 capsule 2    triamterene-hydroCHLOROthiazide  (DYAZIDE) 37.5-25 MG Oral Cap Take 1 capsule by mouth every morning. (Patient not taking: Reported on 8/12/2023) 90 capsule 1    Albuterol Sulfate  (90 Base) MCG/ACT Inhalation Aero Soln Inhale 2 puffs into the lungs every 8 (eight) hours as needed for Wheezing or Shortness of Breath. (Patient not taking: Reported on 1/11/2024) 1 Inhaler 1    clotrimazole-betamethasone 1-0.05 % External Cream Apply cream on affected area for for 1  Week   Than as needed (Patient not taking: Reported on 8/12/2023) 60 g 0     Allergies   Allergen Reactions    Cats Claw, Uncaria Tomentosa ITCHING           ROS   GENERAL: feels well ,otherwise negative for fever or chills  HEENT: negative for sinus pain or mild  sore throat clear throat feels postnasal drainage runny nose congestion  LUNGS: negative for shortness of breath   Very slight dry cough , occasional at night wheezing   CARDIOVASCULAR: negative for chest pain dyspnea on exertion or palpitations   MUSCULOSKELETAL: negative for body  aches   ALL/ASTHMA: + for allergy symptoms     Physical Exam  Patient alert and oriented x3  Patient does not appear in any respiratory distress during the conversation  No labored breathing     Summary of topics discussed/ diagnosis:  Assessment and plan     1. Viral syndrome  Keep with good water hydration   COVID-19 negative   Strep rapid negative in [facility]   Patient to keep with good water hydration  Tylenol 500 mg 3-4 times daily as needed  Rest  Use tea, soups ,home remedies  Follow-up in office 1 to 2 weeks or sooner if any concerns or symptoms  Call if any concerns or further symptoms    Asthma intermittent mild  Stop prednisone since could not sleep last night does not feel it is helping   Start  albuterol inhaler 2 puffs every 8 hours as needed  Pt education        3 Allergic rhinitis, unspecified seasonality, unspecified trigger  Loratidine 10 mg daily Flonase 2 puffs in each nostril once daily      Follow Up: 1-2 weeks         Duration  of service, 20 in minutes:         Patient advised to follow CDC guidelines for self isolation and symptomatic treatment as outlined on CDC Patient Guidelines.     Explained to patient rationale of phone triage and evaluation due to current COVID-19 outbreak based upon CDC recommendations, as well as limitations of video triage including but not limited to the inability to perform appropriate physical exam nor face-to-face examination, which may limit and/or reduce diagnostic accuracy. Franco [place]ico was informed and agrees that this telemedicine visit will charged. Franco Golden expresses understanding, accepts these limitations, and agrees to video evaluation as documented above. Franco Golden understands video evaluation is not a substitute for face-to-face examination or emergency care. Patient advised to go to ER or call 911 for worsening symptoms or acute distress.   Lesly Gonzalez MD

## 2024-01-12 NOTE — TELEPHONE ENCOUNTER
Action Requested: Summary for Provider     []  Critical Lab, Recommendations Needed  [] Need Additional Advice  []   FYI    []   Need Orders  [] Need Medications Sent to Pharmacy  []  Other     SUMMARY: Per protocol disposition advised office visit today     Future Appointments   Date Time Provider Department Center   1/12/2024 10:30 AM Lesly Gonzalez MD [id number] [place]     Patient scheduled for a video visit.  Patient advised to complete the E-check in [facility] [employer], if active.  Understands to follow the prompts and links to complete the visit.    Patient advised that there may be a co-pay involved with this type of visit.     Patient agreed to proceed, they understand the provider may be calling from a blocked, or unknown phone number on their caller ID and they know to answer the phone.    Best call back:  [phone]       Reason for call: Sinus Problem  Onset: 4 days   Patient seen in [facility] yesterday, he had negative COVID/strep.     Per [facility] notes:  XAVI Golden is a 59 year old male  presents with throat pain x 3 days. Patient reports dysphagia to both solids and liquids, ear pain, rhinorrhea, fevers, chills. Patient denies shortness of breath, respiratory distress, stridor, neck pain/ stiffness, headache, eye pain/ redness, facial/ lip/ eyelid swelling. No medications taken prior to arrival. No alleviating/ aggravating factors. Pain 4/10        Medical Decision Making  59-year-old male presents with acute viral pharyngitis with reported recent COVID-19 exposure  Plan  - SpO2 95% on room air    - labs: COVID 19/ strep swab  - meds: decadron 8mg po now   - DC to home     - OTC: Tylenol 1 g po q 6 hours/ prn. cepacol lozenges po every 4-6 hours/ prn.    - encourage oral fluid rehydration and warm salt water rinses for symptomatic relief.   - refer to primary care physician  - Return to ED if symptoms worsen    Clinical Impression:  1. Acute cough    2. Viral pharyngitis        Update: patient states temp  99F. Sinus pain in front of face around eyes and forehead. Ear pain in right ear. Denies other symptoms marked no under protocol.     Reason for Disposition   Earache    Protocols used: Sinus Pain or Congestion-A-[place]

## 2024-01-13 ENCOUNTER — TELEPHONE (OUTPATIENT)
Dept: INTERNAL MEDICINE CLINIC | Facility: CLINIC | Age: 59
End: 2024-01-13

## 2024-01-13 NOTE — TELEPHONE ENCOUNTER
Per Dr Perez: Green phlegm does not warrant antibiotics. Continue symptomatic treatment.     Spoke with pt and Dr Perez's message given.  Pt agrees to plan.   Advised IC if symptoms worsen and pt agrees to plan

## 2024-01-13 NOTE — TELEPHONE ENCOUNTER
Pt states had video visit with Dr Gonzalez yesterday for upper resp symptoms.  Per pt symptoms have worsened to persistent cough with green phlegm.  Pt is asking for antibiotics.    Pt denies SOB, wheezing, chest pain, fever.    Pt states symptoms have been occurring for three days.  Please advise.    Dr Gonzalez unavailable routed to [facility] for recommendations.

## 2024-01-14 ENCOUNTER — TELEPHONE (OUTPATIENT)
Dept: INTERNAL MEDICINE CLINIC | Facility: CLINIC | Age: 59
End: 2024-01-14

## 2024-01-14 DIAGNOSIS — R05.1 ACUTE COUGH: Primary | ICD-10-CM

## 2024-01-14 RX ORDER — BENZONATATE 100 MG/1
100 CAPSULE ORAL 3 TIMES DAILY PRN
Qty: 30 CAPSULE | Refills: 0 | Status: SHIPPED | OUTPATIENT
Start: 2024-01-14

## 2024-01-14 NOTE — TELEPHONE ENCOUNTER
Patient ID: Franco Golden is a 59 year old male.  Chief Complaint   Patient presents with    Cough        Virtual/Telephone Check-In    Franco Golden verbally consents to a Virtual/Telephone Check-In service on 01/14/24. Patient understands and accepts financial responsibility for any deductible, co-insurance and/or co-pays associated with this service.  Patient call triage note reviewed.    HISTORY OF PRESENT ILLNESS:   Patient presents for above.  This is a telephone visit from an on-call page.  Patient 4 day history of cough.  Has subjective fevers.  Initially tested negative for COVID on 1/11/2024.  Went to urgent care on 1/11/2024 and was diagnosed with viral pharyngitis.  Did televist with provider on 1/12/2024 and again was diagnosed with viral pharyngitis.  Called 1/14/2024 wanting antibiotics.  Not given at that z[facility].  Calling again today with above symptoms.  Feeling congestion in sinuses now.    MEDICAL HISTORY:     Past Medical History:   Diagnosis Date    ADD (attention deficit disorder)     Benign skin lesion of forehead     Deep vein thrombosis (HCC)     Esophageal reflux     High blood pressure     History of blood clots     2013 DVT Left leg    History of COVID-19     Unspecified essential hypertension        Past Surgical History:   Procedure Laterality Date    ELECTROCARDIOGRAM, COMPLETE  4/6/2012    EXC SKIN BENIG 2.1-3CM FACE,FACIAL      REPR CMPL WND HEAD,FAC,HAND 2.6-7.5           Current Outpatient Medications:     benzonatate (TESSALON PERLES) 100 MG Oral Cap, Take 1 capsule (100 mg total) by mouth 3 (three) times daily as needed., Disp: 30 capsule, Rfl: 0    albuterol (PROAIR HFA) 108 (90 Base) MCG/ACT Inhalation Aero Soln, Inhale 2 puffs into the lungs every 8 (eight) hours as needed for Wheezing., Disp: 1 each, Rfl: 0    loratadine 10 MG Oral Tab, Take 1 tablet (10 mg total) by mouth daily. 1 tablet orally once daily  for -2 weeks than as needed, Disp: 30 tablet, Rfl: 1    fluticasone  propionate 50 MCG/ACT Nasal Suspension, 2 sprays by Each Nare route daily. Apply two puffs in each nostril once daily for 1-2 weeks then as needed, Disp: 1 each, Rfl: 0    Bisoprolol Fumarate 10 MG Oral Tab, Take 1 tablet (10 mg total) by mouth every morning., Disp: 90 tablet, Rfl: 0    methylPREDNISolone (MEDROL) 4 MG Oral Tablet Therapy Pack, Dosepack: take as directed (Patient not taking: Reported on 8/12/2023), Disp: 1 each, Rfl: 0    PEG 3350-KCl-Na Bicarb-NaCl (TRILYTE) 420 g Oral Recon Soln, Take prep as directed by gastro office. May substitute with Trilyte/generic equivalent if needed. (Patient not taking: Reported on 8/12/2023), Disp: 4000 mL, Rfl: 0    fluticasone propionate 50 MCG/ACT Nasal Suspension, 2 sprays by Nasal route daily. For 1-2 weeks than as needed (Patient not taking: Reported on 8/12/2023), Disp: 1 each, Rfl: 1    saccharomyces boulardii (FLORASTOR) 250 MG Oral Cap, Take 1 capsule (250 mg total) by mouth 2 (two) times daily. (Patient not taking: Reported on 8/12/2023), Disp: 20 capsule, Rfl: 0    Omeprazole 40 MG Oral Capsule Delayed Release, Take 1 capsule (40 mg total) by mouth daily. Before a meal (Patient not taking: Reported on 8/12/2023), Disp: 30 capsule, Rfl: 2    triamterene-hydroCHLOROthiazide (DYAZIDE) 37.5-25 MG Oral Cap, Take 1 capsule by mouth every morning. (Patient not taking: Reported on 8/12/2023), Disp: 90 capsule, Rfl: 1    Albuterol Sulfate  (90 Base) MCG/ACT Inhalation Aero Soln, Inhale 2 puffs into the lungs every 8 (eight) hours as needed for Wheezing or Shortness of Breath. (Patient not taking: Reported on 1/11/2024), Disp: 1 Inhaler, Rfl: 1    clotrimazole-betamethasone 1-0.05 % External Cream, Apply cream on affected area for for 1  Week   Than as needed (Patient not taking: Reported on 8/12/2023), Disp: 60 g, Rfl: 0    Allergies:  Allergies   Allergen Reactions    Cats Claw, Uncaria Tomentosa ITCHING       Social History     Socioeconomic History     Marital status:      Spouse name: Not on file    Number of children: Not on file    Years of education: Not on file    Highest education level: Not on file   Occupational History    Not on file   Tobacco Use    Smoking status: Never     Passive exposure: Never    Smokeless tobacco: Never   Vaping Use    Vaping Use: Never used   Substance and Sexual Activity    Alcohol use: Yes     Alcohol/week: 0.0 standard drinks of alcohol     Comment: 1-2 beers/week, last alcohol use was 2 weeks ago    Drug use: No    Sexual activity: Not on file   Other Topics Concern     Service Not Asked    Blood Transfusions Not Asked    Caffeine Concern Yes     Comment: coffee, tea, 2 cups daily    Occupational Exposure Not Asked    Hobby Hazards Not Asked    Sleep Concern Not Asked    Stress Concern Not Asked    Weight Concern Not Asked    Special Diet Not Asked    Back Care Not Asked    Exercise Not Asked    Bike Helmet Not Asked    Seat Belt Not Asked    Self-Exams Not Asked   Social History Narrative    Not on file     Social Determinants of Health     Financial Resource Strain: Not on file   Food Insecurity: Not on file   Transportation Needs: Not on file   Physical Activity: Not on file   Stress: Not on file   Social Connections: Not on file   Housing Stability: Not on file       PHYSICAL EXAM:   Unable to perform vitals or do physical exam as this is a telephone visit.    ASSESSMENT/PLAN:   1. Acute cough  benzonatate (TESSALON PERLES) 100 MG Oral Cap; Take 1 capsule (100 mg total) by mouth 3 (three) times daily as needed.  Dispense: 30 capsule; Refill: 0  Antibiotics still not warranted in this case.  Check COVID again.  Symptomatic treatment.    Return if symptoms worsen or fail to improve.    Time spent on encounter  11 minutes   Telephone time 6 minutes   Documentation time 5 minutes     Franco Golden understands phone evaluation is not a substitute for face-to-face examination or emergency care. Patient advised to  go to ER or call 911 for worsening symptoms or acute distress.    Please note that the following visit was completed using two-way, real-time interactive audio and/or video communication.  This has been done in good safia to provide continuity of care in the best interest of the provider-patient relationship, due to the ongoing public health crisis/national emergency and because of restrictions of visitation.  There are limitations of this visit as no physical exam could be performed.  Every conscious effort was taken to allow for sufficient and adequate time.  This billing was spent on reviewing labs, medications, radiology tests and decision making.  Appropriate medical decision-making and tests are ordered as detailed in the plan of care above.    This note was prepared using Dragon Medical voice recognition dictation software. As a result errors may occur. When identified these errors have been corrected. While every attempt is made to correct errors during dictation discrepancies may still exist.    Moiz Perez MD  1/14/2024

## 2024-01-15 ENCOUNTER — OFFICE VISIT (OUTPATIENT)
Dept: INTERNAL MEDICINE CLINIC | Facility: CLINIC | Age: 59
End: 2024-01-15

## 2024-01-15 VITALS
HEIGHT: 72 IN | OXYGEN SATURATION: 96 % | HEART RATE: 88 BPM | TEMPERATURE: 99 F | SYSTOLIC BLOOD PRESSURE: 130 MMHG | WEIGHT: 251.81 LBS | RESPIRATION RATE: 18 BRPM | BODY MASS INDEX: 34.11 KG/M2 | DIASTOLIC BLOOD PRESSURE: 84 MMHG

## 2024-01-15 DIAGNOSIS — J02.9 SORE THROAT: ICD-10-CM

## 2024-01-15 DIAGNOSIS — B34.9 VIRAL SYNDROME: Primary | ICD-10-CM

## 2024-01-15 DIAGNOSIS — I10 ESSENTIAL HYPERTENSION: ICD-10-CM

## 2024-01-15 PROCEDURE — 87880 STREP A ASSAY W/OPTIC: CPT | Performed by: INTERNAL MEDICINE

## 2024-01-15 PROCEDURE — 99214 OFFICE O/P EST MOD 30 MIN: CPT | Performed by: INTERNAL MEDICINE

## 2024-01-15 PROCEDURE — 3079F DIAST BP 80-89 MM HG: CPT | Performed by: INTERNAL MEDICINE

## 2024-01-15 PROCEDURE — 3075F SYST BP GE 130 - 139MM HG: CPT | Performed by: INTERNAL MEDICINE

## 2024-01-15 PROCEDURE — 3008F BODY MASS INDEX DOCD: CPT | Performed by: INTERNAL MEDICINE

## 2024-01-15 RX ORDER — FLUTICASONE PROPIONATE 50 MCG
2 SPRAY, SUSPENSION (ML) NASAL DAILY
Qty: 1 EACH | Refills: 0 | Status: SHIPPED | OUTPATIENT
Start: 2024-01-15 | End: 2025-01-09

## 2024-01-15 RX ORDER — FEXOFENADINE HCL 180 MG/1
180 TABLET ORAL DAILY
Qty: 90 TABLET | Refills: 0 | Status: SHIPPED | OUTPATIENT
Start: 2024-01-15

## 2024-01-15 RX ORDER — DOXYCYCLINE HYCLATE 100 MG
100 TABLET ORAL 2 TIMES DAILY
Qty: 14 TABLET | Refills: 0 | Status: SHIPPED | OUTPATIENT
Start: 2024-01-15

## 2024-01-15 RX ORDER — BISOPROLOL FUMARATE 10 MG/1
10 TABLET, FILM COATED ORAL EVERY MORNING
Qty: 90 TABLET | Refills: 0 | Status: SHIPPED | OUTPATIENT
Start: 2024-01-15

## 2024-01-15 NOTE — PROGRESS NOTES
HPI:    Patient ID: Franco Golden is a 59 year old male.  Chief Complaint   Patient presents with    Sinus Problem     C/o watery cough with green phlegm, post nasal drip. Has not checked temp since going to immediate care.        Patient presents today for  F/u strep throat  was in [facility]    10/26 /22 .  Was prescribed on steroids and Augmentin     missed  3 days of antibiotic - due to slipped pills  in car    Last  Dosage not sure when .feels strep  Was  Coming back got antibiotic over the weekend and  Feels better now . No  Sore throat , has some postnasal drainage and stuffy nose .    Patient states he is otherwise feeling well  Patient states feeling well otherwise. Denies chest pain, shortnesss of breath, palpitations, or abdominal pain, denies UTI symptoms fever or chills.        Review of Systems   Constitutional: Negative for fatigue and fever.   Eyes: Negative for pain and redness.   Respiratory: Negative for wheezing.    Cardiovascular: Negative for chest pain, palpitations and leg swelling.   Gastrointestinal: Negative for abdominal pain, constipation, diarrhea, nausea and vomiting.   Genitourinary: Negative for dysuria and frequency.   Skin: Negative for pallor.   Neurological: Negative for dizziness.   Psychiatric/Behavioral: Negative for confusion. The patient is not nervous/anxious.             Current Outpatient Medications   Medication Sig Dispense Refill    Bisoprolol Fumarate 10 MG Oral Tab Take 1 tablet (10 mg total) by mouth every morning. 90 tablet 0    fexofenadine (ALLEGRA ALLERGY) 180 MG Oral Tab Take 1 tablet (180 mg total) by mouth daily. Take 1 tablet once daily for 2-3  weeks and then as needed. 90 tablet 0    fluticasone propionate 50 MCG/ACT Nasal Suspension 2 sprays by Each Nare route daily. 1-2 weeks than as needed 1 each 0    Doxycycline Hyclate 100 MG Oral Tab Take 1 tablet (100 mg total) by mouth 2 (two) times daily. 14 tablet 0    benzonatate (TESSALON PERLES) 100 MG Oral Cap Take 1  capsule (100 mg total) by mouth 3 (three) times daily as needed. (Patient not taking: Reported on 1/15/2024) 30 capsule 0    albuterol (PROAIR HFA) 108 (90 Base) MCG/ACT Inhalation Aero Soln Inhale 2 puffs into the lungs every 8 (eight) hours as needed for Wheezing. (Patient not taking: Reported on 1/15/2024) 1 each 0    methylPREDNISolone (MEDROL) 4 MG Oral Tablet Therapy Pack Dosepack: take as directed (Patient not taking: Reported on 8/12/2023) 1 each 0    PEG 3350-KCl-Na Bicarb-NaCl (TRILYTE) 420 g Oral Recon Soln Take prep as directed by gastro office. May substitute with Trilyte/generic equivalent if needed. (Patient not taking: Reported on 8/12/2023) 4000 mL 0    saccharomyces boulardii (FLORASTOR) 250 MG Oral Cap Take 1 capsule (250 mg total) by mouth 2 (two) times daily. (Patient not taking: Reported on 8/12/2023) 20 capsule 0    Omeprazole 40 MG Oral Capsule Delayed Release Take 1 capsule (40 mg total) by mouth daily. Before a meal (Patient not taking: Reported on 8/12/2023) 30 capsule 2    triamterene-hydroCHLOROthiazide (DYAZIDE) 37.5-25 MG Oral Cap Take 1 capsule by mouth every morning. (Patient not taking: Reported on 8/12/2023) 90 capsule 1    Albuterol Sulfate  (90 Base) MCG/ACT Inhalation Aero Soln Inhale 2 puffs into the lungs every 8 (eight) hours as needed for Wheezing or Shortness of Breath. (Patient not taking: Reported on 1/15/2024) 1 Inhaler 1    clotrimazole-betamethasone 1-0.05 % External Cream Apply cream on affected area for for 1  Week   Than as needed (Patient not taking: Reported on 8/12/2023) 60 g 0     Allergies:  Allergies   Allergen Reactions    Cats Claw, Uncaria Tomentosa ITCHING      PHYSICAL EXAM:   Physical Exam  Vitals and nursing note reviewed.   Constitutional:       General: He is not in acute distress.     Appearance: He is well-developed.      Comments: Obese    HENT:      Head: Normocephalic and atraumatic.      Right Ear: Tympanic membrane, ear canal and  external ear normal.      Left Ear: Tympanic membrane, ear canal and external ear normal.      Nose: Nose normal.   Mouth -pharynx with mild erythema no exudates no swelling      Eyes:      General: No scleral icterus.        Right eye: No discharge.         Left eye: No discharge.   Neck:      Thyroid: No thyromegaly.      Vascular: No JVD.   Cardiovascular:      Rate and Rhythm: Normal rate and regular rhythm.      Heart sounds: Normal heart sounds. No murmur heard.  Pulmonary:      Effort: Pulmonary effort is normal. No respiratory distress.      Breath sounds: Normal breath sounds. No wheezing or rales.   Abdominal:      Palpations: Abdomen is soft. There is no mass.      Tenderness: There is no abdominal tenderness.   Musculoskeletal:      Cervical back: Neck supple.  No edema bill lower  legs     Lymphadenopathy:      Cervical: No cervical adenopathy.   Skin:     General: Skin is warm and dry.   Neurological:      Mental Status: He is alert and oriented to person, place, and time.   Psychiatric:         Behavior: Behavior normal.         Blood pressure 130/84, pulse 88, temperature 98.8 °F (37.1 °C), temperature source Tympanic, resp. rate 18, height 6' (1.829 m), weight 251 lb 12.8 oz (114.2 kg), SpO2 96%.           ASSESSMENT/PLAN:   Sore throat   Strep throat  rapid -negative   Fluids   Stable  cpm   Patient  symptomatic has a lots of sore throat as well as fatigue not improving over the last couple days  Possible strep please see?  Recommend patient to start taking antibiotic doxycycline 100 mg twice daily 1 week  Fluids rest    HTN   Take high blood pressure medication as perscribed   Low- sodium diet   Maintain walking - as tolerated   Track and record blood pressure and heart rate at home   The side effects of medication discussed with patient   Patient verbalizes understanding and compliance   Bisoprolol 10 mg every day   Stable     Side effects and directions of medication discussed with patient.  Patient verbalized understanding and compliance.          Allergic rhinitis due to other allergic trigger, unspecified seasonality  Allegra 180 mg daily 2-3  Weeks as needed    Flonase 2 puffs in each nostril once daily for 1 week than as needed  Medication sent to the pharmacy              Orders Placed This Encounter   Procedures    POC Rapid Strep [12147]    COVID-19 Testing by PCR ([facility]) [E]       Meds This Visit:  Requested Prescriptions     Signed Prescriptions Disp Refills    Bisoprolol Fumarate 10 MG Oral Tab 90 tablet 0     Sig: Take 1 tablet (10 mg total) by mouth every morning.    fexofenadine (ALLEGRA ALLERGY) 180 MG Oral Tab 90 tablet 0     Sig: Take 1 tablet (180 mg total) by mouth daily. Take 1 tablet once daily for 2-3  weeks and then as needed.    fluticasone propionate 50 MCG/ACT Nasal Suspension 1 each 0     Si sprays by Each Nare route daily. 1-2 weeks than as needed    Doxycycline Hyclate 100 MG Oral Tab 14 tablet 0     Sig: Take 1 tablet (100 mg total) by mouth 2 (two) times daily.       Imaging & Referrals:  None       ID#[id number]

## 2024-01-16 LAB — SARS-COV-2 RNA RESP QL NAA+PROBE: NOT DETECTED

## 2024-02-08 DIAGNOSIS — B34.9 VIRAL SYNDROME: ICD-10-CM

## 2024-02-09 NOTE — TELEPHONE ENCOUNTER
Please review; protocol failed.    Requested Prescriptions   Pending Prescriptions Disp Refills    BISOPROLOL FUMARATE 10 MG Oral Tab [Pharmacy Med Name: Bisoprolol Fumarate 10 Mg Tab Nort] 90 tablet 0     Sig: Take 1 tablet by mouth every morning.       Hypertension Medications Protocol Failed - 2/8/2024  9:35 AM        Failed - CMP or BMP in past 12 months        Failed - EGFRCR or GFRNAA > 50     GFR Evaluation            Passed - Last BP reading less than 140/90     BP Readings from Last 1 Encounters:   01/15/24 130/84               Passed - In person appointment or virtual visit in the past 12 mos or appointment in next 3 mos     Recent Outpatient Visits              3 weeks ago Viral syndrome    [facility], Lesly Mcdaniel MD    Office Visit    4 weeks ago Viral syndrome    [facility], [place] Vukomanovic, Emela, MD    Telemedicine    6 months ago Sinus pain    [facility], Debbie Cohen APRN    Office Visit    8 months ago Sensorineural hearing loss, bilateral    [facility], [place] Waddell Au.D    Office Visit    8 months ago Tinnitus, unspecified laterality    [facility], Master Leach MD    Office Visit

## 2024-02-11 RX ORDER — BISOPROLOL FUMARATE 10 MG/1
10 TABLET, FILM COATED ORAL EVERY MORNING
Qty: 90 TABLET | Refills: 1 | Status: SHIPPED | OUTPATIENT
Start: 2024-02-11

## 2024-04-12 RX ORDER — FEXOFENADINE HCL 180 MG/1
180 TABLET ORAL AS NEEDED
Qty: 90 TABLET | Refills: 0 | Status: SHIPPED | OUTPATIENT
Start: 2024-04-12

## 2024-04-12 NOTE — TELEPHONE ENCOUNTER
Refill passed per [facility] protocol.  Requested Prescriptions   Pending Prescriptions Disp Refills    FEXOFENADINE 180 MG Oral Tab [Pharmacy Med Name: Fexofenadine Hydrochloride 180 Mg Tab Nort] 90 tablet 0     Sig: TAKE 1 TABLET BY MOUTH ONCE DAILY FOR 2-3  WEEKS AND THEN AS NEEDED       Allergy Medication Protocol Passed - 4/11/2024  9:46 AM        Passed - In person appointment or virtual visit in the past 12 mos or appointment in next 3 mos     Recent Outpatient Visits              2 months ago Viral syndrome    [facility], Lesly Mcdaniel MD    Office Visit    3 months ago Viral syndrome    [facility] [place] Vukomanovic, Emela, MD    Telemedicine    8 months ago Sinus pain    [facility], Debbie Cohen APRN    Office Visit    10 months ago [facility]ensorine[facility] loss, [facility]    [facility]    10 months [facility], [facility]                         Recent Outpatient Visits              [facility]    [facility]    3 months ago Viral syndrome    [facility], [place] Vukomanovic, Emela, MD    Telemedicine    8 months ago Sinus pain    [facility], Debbie Cohen APRN    Office Visit    10 months ago Sensorineural hearing loss, bilateral    [facility], [place] Waddell Au.D    Office Visit    10 months ago Tinnitus, unspecified laterality    [facility], Master Leach MD    Office Visit

## 2024-04-24 ENCOUNTER — OFFICE VISIT (OUTPATIENT)
Dept: INTERNAL MEDICINE CLINIC | Facility: CLINIC | Age: 59
End: 2024-04-24
Payer: COMMERCIAL

## 2024-04-24 VITALS
OXYGEN SATURATION: 96 % | BODY MASS INDEX: 34.27 KG/M2 | HEART RATE: 88 BPM | DIASTOLIC BLOOD PRESSURE: 88 MMHG | WEIGHT: 253 LBS | HEIGHT: 72 IN | SYSTOLIC BLOOD PRESSURE: 132 MMHG

## 2024-04-24 DIAGNOSIS — J30.9 ALLERGIC RHINITIS, UNSPECIFIED SEASONALITY, UNSPECIFIED TRIGGER: Primary | ICD-10-CM

## 2024-04-24 PROCEDURE — 99213 OFFICE O/P EST LOW 20 MIN: CPT

## 2024-04-24 RX ORDER — FLUTICASONE PROPIONATE 50 MCG
2 SPRAY, SUSPENSION (ML) NASAL DAILY
Qty: 1 EACH | Refills: 0 | Status: SHIPPED | OUTPATIENT
Start: 2024-04-24 | End: 2025-04-19

## 2024-04-24 RX ORDER — FEXOFENADINE HCL 180 MG/1
180 TABLET ORAL AS NEEDED
Qty: 90 TABLET | Refills: 0 | Status: SHIPPED | OUTPATIENT
Start: 2024-04-24

## 2024-04-24 NOTE — PROGRESS NOTES
Subjective:   Franco Golden is a 59 year old male who presents for Sinus Problem (Having sinus pressure, runny nose, sneezing, sore throat, and ringing in ears) and Ringing In Ear     Symptoms are present for a few days, not quite as bad today and almost cancelled his appointment. Has sinus pressure, ringing in the right ear which are improving today. 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 Also having itchy watery eyes, coughs up a small amount of green phlegm in the mornings and feels post nasal drainage. No fever or chills. Not using any over the counter meds. Used to be on fexofenadine and flonase nasal spray but has not used these recently.    Missed bisoprolol dose today     Has albuterol but has not needed it recently     Has a chronic problem with a right upper tooth, had a root canal and needs the tooth pulled but has been delaying it. The other day he was eating popcorn a piece of his tooth fell out. He is wondering if it is infected.    Wondering what he can do to lose weight  Diet is \"crummy\" right now- a lot of fast food  No current exercise    History/Other:    Chief Complaint Reviewed and Verified  Nursing Notes Reviewed and   Verified  Tobacco Reviewed  Allergies Reviewed  Medications Reviewed         Tobacco:  He has never smoked tobacco.    Current Outpatient Medications   Medication Sig Dispense Refill    fluticasone propionate 50 MCG/ACT Nasal Suspension 2 sprays by Each Nare route daily. 1-2 weeks than as needed 1 each 0    fexofenadine 180 MG Oral Tab Take 1 tablet (180 mg total) by mouth as needed. 90 tablet 0    Bisoprolol Fumarate 10 MG Oral Tab Take 1 tablet (10 mg total) by mouth every morning. 90 tablet 1    Doxycycline Hyclate 100 MG Oral Tab Take 1 tablet (100 mg total) by mouth 2 (two) times daily. (Patient not taking: Reported on 4/24/2024) 14 tablet 0    benzonatate (TESSALON PERLES) 100 MG Oral Cap Take 1 capsule (100 mg total) by  mouth 3 (three) times daily as needed. (Patient not taking: Reported on 1/15/2024) 30 capsule 0    albuterol (PROAIR HFA) 108 (90 Base) MCG/ACT Inhalation Aero Soln Inhale 2 puffs into the lungs every 8 (eight) hours as needed for Wheezing. (Patient not taking: Reported on 1/15/2024) 1 each 0    methylPREDNISolone (MEDROL) 4 MG Oral Tablet Therapy Pack Dosepack: take as directed (Patient not taking: Reported on 8/12/2023) 1 each 0    PEG 3350-KCl-Na Bicarb-NaCl (TRILYTE) 420 g Oral Recon Soln Take prep as directed by gastro office. May substitute with Trilyte/generic equivalent if needed. (Patient not taking: Reported on 8/12/2023) 4000 mL 0    saccharomyces boulardii (FLORASTOR) 250 MG Oral Cap Take 1 capsule (250 mg total) by mouth 2 (two) times daily. (Patient not taking: Reported on 8/12/2023) 20 capsule 0    Omeprazole 40 MG Oral Capsule Delayed Release Take 1 capsule (40 mg total) by mouth daily. Before a meal (Patient not taking: Reported on 8/12/2023) 30 capsule 2    triamterene-hydroCHLOROthiazide (DYAZIDE) 37.5-25 MG Oral Cap Take 1 capsule by mouth every morning. (Patient not taking: Reported on 8/12/2023) 90 capsule 1    Albuterol Sulfate  (90 Base) MCG/ACT Inhalation Aero Soln Inhale 2 puffs into the lungs every 8 (eight) hours as needed for Wheezing or Shortness of Breath. (Patient not taking: Reported on 1/15/2024) 1 Inhaler 1    clotrimazole-betamethasone 1-0.05 % External Cream Apply cream on affected area for for 1  Week   Than as needed (Patient not taking: Reported on 8/12/2023) 60 g 0       Review of Systems:  Review of Systems   Constitutional: Negative.  Negative for chills and fever.   HENT:  Positive for postnasal drip, sinus pressure, sneezing, sore throat and tinnitus.    Eyes:  Positive for itching.   Respiratory: Negative.  Negative for shortness of breath.    Cardiovascular: Negative.    Gastrointestinal: Negative.    Skin: Negative.    Neurological: Negative.      Objective:    /88   Pulse 88   Ht 6' (1.829 m)   Wt 253 lb (114.8 kg)   SpO2 96%   BMI 34.31 kg/m²  Estimated body mass index is 34.31 kg/m² as calculated from the following:    Height as of this encounter: 6' (1.829 m).    Weight as of this encounter: 253 lb (114.8 kg).  Physical Exam  Vitals reviewed.   Constitutional:       General: He is not in acute distress.     Appearance: Normal appearance. He is well-developed. He is not ill-appearing.   HENT:      Right Ear: Tympanic membrane normal.      Left Ear: Tympanic membrane normal.      Nose: Nose normal.      Mouth/Throat:      Mouth: Mucous membranes are moist.      Pharynx: Oropharynx is clear.        Comments: Missing tooth right upper, no signs of infection- no redness, swelling, purulent drainage  Cardiovascular:      Rate and Rhythm: Normal rate and regular rhythm.      Heart sounds: Normal heart sounds.   Pulmonary:      Effort: Pulmonary effort is normal.      Breath sounds: Normal breath sounds.   Lymphadenopathy:      Cervical: No cervical adenopathy.   Skin:     General: Skin is warm and dry.   Neurological:      Mental Status: He is alert and oriented to person, place, and time.       Assessment & Plan:   1. Allergic rhinitis, unspecified seasonality, unspecified trigger (Primary)  -     Fluticasone Propionate; 2 sprays by Each Nare route daily. 1-2 weeks than as needed  Dispense: 1 each; Refill: 0  -     Fexofenadine HCl; Take 1 tablet (180 mg total) by mouth as needed.  Dispense: 90 tablet; Refill: 0  Suspect allergy related, start flonase and fexofenadine and notify the office if not improving     NAZANIN Stephens, 4/24/2024, 5:46 PM

## 2024-08-07 DIAGNOSIS — B34.9 VIRAL SYNDROME: ICD-10-CM

## 2024-08-11 RX ORDER — BISOPROLOL FUMARATE 10 MG/1
10 TABLET, FILM COATED ORAL EVERY MORNING
Qty: 90 TABLET | Refills: 3 | Status: SHIPPED | OUTPATIENT
Start: 2024-08-11

## 2024-08-11 NOTE — TELEPHONE ENCOUNTER
Please review. Protocol Failed; No Protocol    Requested Prescriptions   Pending Prescriptions Disp Refills    BISOPROLOL FUMARATE 10 MG Oral Tab [Pharmacy Med Name: Bisoprolol Fumarate 10 Mg Tab Nort] 90 tablet 0     Sig: Take 1 tablet (10 mg total) by mouth every morning.       Hypertension Medications Protocol Failed - 8/7/2024  9:03 PM        Failed - CMP or BMP in past 12 months        Failed - EGFRCR or GFRNAA > 50     GFR Evaluation            Passed - Last BP reading less than 140/90     BP Readings from Last 1 Encounters:   04/24/24 132/88               Passed - In person appointment or virtual visit in the past 12 mos or appointment in next 3 mos     Recent Outpatient Visits              3 months ago Allergic rhinitis, unspecified seasonality, unspecified trigger    [facility] Debbie Cohen APRN    Office Visit    6 months ago Viral syndrome    [facility] Lesly Mcdaniel MD    Office Visit    7 months ago Viral syndrome    [facility] [place] Vukomanovic, Emela, MD    Telemedicine    1 year ago Sinus pain    [facility] Debbie Cohen APRN    Office Visit    1 year ago Sensorineural hearing loss, bilateral    [facility] [place] Waddell Au.D    Office Visit                               Recent Outpatient Visits              3 months ago Allergic rhinitis, unspecified seasonality, unspecified trigger    [facility], Debbie Cohen, NAZANIN    Office Visit    6 months ago [facility]al syndrome    [facility]    [facility] Visit    7 months ago Viral syndrome    [facility] [place] Vukomanovic, Emela, MD    Telemedicine    1 year ago Sinus pain    [facility]  [facility], [facility], [place] Fragoso APRN    Office Visit    1 year ago Sensorineural hearing loss, bilateral    [facility], [place] Lo Au.D    Office Visit

## 2024-10-07 ENCOUNTER — HOSPITAL ENCOUNTER (OUTPATIENT)
Age: 59
Discharge: HOME | End: 2024-10-07
Payer: COMMERCIAL

## 2024-10-07 VITALS
SYSTOLIC BLOOD PRESSURE: 130 MMHG | HEART RATE: 81 BPM | DIASTOLIC BLOOD PRESSURE: 86 MMHG | HEIGHT: 72 IN | WEIGHT: 257 LBS | RESPIRATION RATE: 22 BRPM | TEMPERATURE: 99 F | BODY MASS INDEX: 34.81 KG/M2 | OXYGEN SATURATION: 95 %

## 2024-10-07 DIAGNOSIS — R07.0 THROAT PAIN: Primary | ICD-10-CM

## 2024-10-07 DIAGNOSIS — U07.1 COVID-19: ICD-10-CM

## 2024-10-07 LAB
S PYO AG THROAT QL: NEGATIVE
SARS-COV-2 RNA RESP QL NAA+PROBE: DETECTED

## 2024-10-07 PROCEDURE — 87880 STREP A ASSAY W/OPTIC: CPT | Performed by: PHYSICIAN ASSISTANT

## 2024-10-07 PROCEDURE — 99214 OFFICE O/P EST MOD 30 MIN: CPT | Performed by: PHYSICIAN ASSISTANT

## 2024-10-07 PROCEDURE — U0002 COVID-19 LAB TEST NON-CDC: HCPCS | Performed by: PHYSICIAN ASSISTANT

## 2024-10-07 RX ORDER — ALBUTEROL SULFATE 90 UG/1
2 INHALANT RESPIRATORY (INHALATION) EVERY 4 HOURS PRN
Qty: 1 EACH | Refills: 0 | Status: SHIPPED | OUTPATIENT
Start: 2024-10-07 | End: 2024-11-06

## 2024-10-07 RX ORDER — BENZONATATE 100 MG/1
100 CAPSULE ORAL 3 TIMES DAILY PRN
Qty: 30 CAPSULE | Refills: 0 | Status: SHIPPED | OUTPATIENT
Start: 2024-10-07 | End: 2024-11-06

## 2024-10-07 RX ORDER — OXYMETAZOLINE HYDROCHLORIDE 0.05 G/100ML
1 SPRAY NASAL 2 TIMES DAILY
Qty: 15 ML | Refills: 0 | Status: SHIPPED | OUTPATIENT
Start: 2024-10-07 | End: 2024-11-06

## 2024-10-07 RX ORDER — PSEUDOEPHEDRINE HCL 30 MG
30 TABLET ORAL 3 TIMES DAILY
Qty: 36 TABLET | Refills: 0 | Status: SHIPPED | OUTPATIENT
Start: 2024-10-07

## 2024-10-08 NOTE — ED PROVIDER NOTES
Patient Seen in: [facility]      History     Chief Complaint   Patient presents with    Cough/URI    Sore Throat     Stated Complaint: sore throat x 2 days    Subjective:   HPI  Franco Golden is a 59 year old male  presents with throat pain x 3 days. Patient reports dysphagia to both solids and liquids, ear pain, rhinorrhea, fevers, chills. Patient denies shortness of breath, respiratory distress, stridor, neck pain/ stiffness, headache, eye pain/ redness, facial/ lip/ eyelid swelling. No medications taken prior to arrival. No alleviating/ aggravating factors. Pain 4/10      Objective:   Past Medical History:    ADD (attention deficit disorder)    Benign skin lesion of forehead    Deep vein thrombosis (HCC)    Esophageal reflux    High blood pressure    History of blood clots    2013 DVT Left leg    History of COVID-19    Unspecified essential hypertension              Past Surgical History:   Procedure Laterality Date    Electrocardiogram, complete  4/6/2012    Exc skin benig 2.1-3cm face,facial      Repr cmpl wnd head,fac,hand 2.6-7.5                  Social History     Socioeconomic History    Marital status:    Tobacco Use    Smoking status: Never     Passive exposure: Never    Smokeless tobacco: Never   Vaping Use    Vaping status: Never Used   Substance and Sexual Activity    Alcohol use: Yes     Alcohol/week: 0.0 standard drinks of alcohol     Comment: 1-2 beers/week, last alcohol use was 2 weeks ago    Drug use: No   Other Topics Concern    Caffeine Concern Yes     Comment: coffee, tea, 2 cups daily              Review of Systems   All other systems reviewed and are negative.      Positive for stated complaint: sore throat x 2 days  Other systems are as noted in HPI.  Constitutional and vital signs reviewed.      All other systems reviewed and negative except as noted above.    Physical Exam     ED Triage Vitals [10/07/24 1915]   /86   Pulse 81   Resp 22   Temp 98.7 °F  (37.1 °C)   Temp src Temporal   SpO2 95 %   O2 Device None (Room air)       Current:/86   Pulse 81   Temp 98.7 °F (37.1 °C) (Temporal)   Resp 22   Ht 182.9 cm (6')   Wt 116.6 kg   SpO2 95%   BMI 34.86 kg/m²         Physical Exam  Vitals and nursing note reviewed.   Constitutional:       General: He is not in acute distress.     Appearance: Normal appearance. He is normal weight. He is not ill-appearing, toxic-appearing or diaphoretic.   HENT:      Head: Normocephalic and atraumatic.      Right Ear: Tympanic membrane and ear canal normal.      Left Ear: Tympanic membrane and ear canal normal.      Nose: Congestion present. No rhinorrhea.      Mouth/Throat:      Mouth: Mucous membranes are moist.      Pharynx: Oropharynx is clear. Posterior oropharyngeal erythema present. No oropharyngeal exudate.      Comments: Mild tonsillar erythema, otherwise NO tonsillar swelling,exudates, uvular deviation, trismus, dysphonia,     Eyes:      Extraocular Movements: Extraocular movements intact.      Conjunctiva/sclera: Conjunctivae normal.      Pupils: Pupils are equal, round, and reactive to light.   Cardiovascular:      Rate and Rhythm: Normal rate.      Pulses: Normal pulses.   Pulmonary:      Effort: Pulmonary effort is normal. No respiratory distress.      Breath sounds: Normal breath sounds. No stridor. No wheezing, rhonchi or rales.   Chest:      Chest wall: No tenderness.   Musculoskeletal:         General: No swelling, tenderness, deformity or signs of injury. Normal range of motion.      Cervical back: Normal range of motion and neck supple.      Right lower leg: No edema.      Left lower leg: No edema.   Skin:     General: Skin is warm and dry.      Capillary Refill: Capillary refill takes less than 2 seconds.      Coloration: Skin is not jaundiced or pale.      Findings: No bruising, erythema, lesion or rash.   Neurological:      General: No focal deficit present.      Mental Status: He is alert and  oriented to person, place, and time. Mental status is at baseline.   Psychiatric:         Mood and Affect: Mood normal.         Behavior: Behavior normal.         Thought Content: Thought content normal.         Judgment: Judgment normal.               ED Course     Labs Reviewed   RAPID SARS-COV-2 BY PCR - Abnormal; Notable for the following components:       Result Value    Rapid SARS-CoV-2 by PCR Detected (*)     All other components within normal limits   POCT RAPID STREP - Normal     Results for orders placed or performed during the hospital encounter of 10/07/24   Rapid SARS-CoV-2 by PCR    Collection Time: 10/07/24  7:15 PM    Specimen: Nares; Other   Result Value Ref Range    Rapid SARS-CoV-2 by PCR Detected (A) Not Detected   POCT Rapid Strep    Collection Time: 10/07/24  7:27 PM   Result Value Ref Range    POCT Rapid Strep Negative Negative                        MDM                                        Medical Decision Making  59-year-old well-appearing male presents with acute onset of URI symptoms with recent COVID-19 exposure. Considerations to include but not limited to bronchitis vs pneumonia vs COVID 19 vs influenza A vs influenza B.  Patient is overall well-appearing, normotensive, nontachycardic, not dyspneic with oxygen saturation at 95% on room air  Plan  - COVID 19/strep  swab  - SpO2 95% on room air  - reassess  - DC to home   - rx: albuterol inhaler 1-2 puffs by mouth every 4-6 hours/ prn.    Afrin 2 sprays to bilateral nostrils BID for no more than 48 consecutive hours to avoid rebound congestion.  Sudafed 30mg po q 6 hours.   Tessalon 100mg PO TID.    - refer to PCP for further evaluation    - return to ED          Amount and/or Complexity of Data Reviewed  Labs: ordered. Decision-making details documented in ED Course.     Details: COVID 19- positive   Strep- negative        Disposition and Plan     Clinical Impression:  1. Throat pain    2. COVID-19         Disposition:  There is no  disposition on file for this visit.  There is no disposition time on file for this visit.    Follow-up:  Lesly Gonzalez MD  [address] [place] [address]  [phone]          [address] [address]  [phone]                Medications Prescribed:  Current Discharge Medication List        START taking these medications    Details   !! benzonatate 100 MG Oral Cap Take 1 capsule (100 mg total) by mouth 3 (three) times daily as needed for cough.  Qty: 30 capsule, Refills: 0      oxymetazoline 0.05 % Nasal Solution 1 spray by Nasal route 2 (two) times daily.  Qty: 15 mL, Refills: 0      pseudoephedrine 30 MG Oral Tab Take 1 tablet (30 mg total) by mouth in the morning, at noon, and at bedtime.  Qty: 36 tablet, Refills: 0    Associated Diagnoses: COVID-19      !! albuterol 108 (90 Base) MCG/ACT Inhalation Aero Soln Inhale 2 puffs into the lungs every 4 (four) hours as needed for Wheezing.  Qty: 1 each, Refills: 0       !! - Potential duplicate medications found. Please discuss with provider.

## 2024-10-08 NOTE — ED INITIAL ASSESSMENT (HPI)
Pt c/o sore throat x3 days and nasal congestion that started last night; Pt rpt his girlfriend recently diagnosed w/ COVID

## 2024-10-09 ENCOUNTER — TELEPHONE (OUTPATIENT)
Dept: INTERNAL MEDICINE CLINIC | Facility: CLINIC | Age: 59
End: 2024-10-09

## 2024-10-09 DIAGNOSIS — Z00.00 PE (PHYSICAL EXAM), ROUTINE: Primary | ICD-10-CM

## 2024-10-09 NOTE — TELEPHONE ENCOUNTER
Pt stated he was exposed to person with COVID on Sunday , started having symptoms Sunday night - headache, more on Monday -feverish-sweaty,light cough- clear sputum, body aches, Tuesday went to -COVID positive- Rx- Inhaler,benzonate,nasal spray coughing while speaking     Pt stated he wants paxlovid - has had it before , stated he's had Covid x 4     If approve, send to [facility] in [place]

## 2024-10-09 NOTE — TELEPHONE ENCOUNTER
Patient did not have  blood  test in last year -  no kidney function available not   able to prescribe paxlovid at  this time .    F/u in 2   weeks  -also need to have physical exam   or sooner  if any concerns or sy -  IC   Labs  before visit time

## 2024-12-27 ENCOUNTER — MOBILE ENCOUNTER (OUTPATIENT)
Dept: INTERNAL MEDICINE CLINIC | Facility: CLINIC | Age: 59
End: 2024-12-27

## 2024-12-27 ENCOUNTER — NURSE TRIAGE (OUTPATIENT)
Dept: INTERNAL MEDICINE CLINIC | Facility: CLINIC | Age: 59
End: 2024-12-27

## 2024-12-27 ENCOUNTER — HOSPITAL ENCOUNTER (OUTPATIENT)
Age: 59
Discharge: HOME | End: 2024-12-27
Payer: COMMERCIAL

## 2024-12-27 VITALS
HEIGHT: 72 IN | SYSTOLIC BLOOD PRESSURE: 143 MMHG | BODY MASS INDEX: 34.54 KG/M2 | WEIGHT: 255 LBS | RESPIRATION RATE: 18 BRPM | HEART RATE: 65 BPM | TEMPERATURE: 97 F | OXYGEN SATURATION: 96 % | DIASTOLIC BLOOD PRESSURE: 86 MMHG

## 2024-12-27 DIAGNOSIS — R05.1 ACUTE COUGH: Primary | ICD-10-CM

## 2024-12-27 DIAGNOSIS — R09.89 SYMPTOMS OF UPPER RESPIRATORY INFECTION (URI): Primary | ICD-10-CM

## 2024-12-27 DIAGNOSIS — J39.2 THROAT IRRITATION: ICD-10-CM

## 2024-12-27 LAB
S PYO AG THROAT QL: NEGATIVE
SARS-COV-2 RNA RESP QL NAA+PROBE: NOT DETECTED

## 2024-12-27 PROCEDURE — 99213 OFFICE O/P EST LOW 20 MIN: CPT | Performed by: INTERNAL MEDICINE

## 2024-12-27 RX ORDER — AZITHROMYCIN 250 MG/1
TABLET, FILM COATED ORAL
Qty: 6 TABLET | Refills: 0 | Status: SHIPPED | OUTPATIENT
Start: 2024-12-27 | End: 2025-01-01

## 2024-12-27 RX ORDER — BENZONATATE 100 MG/1
100 CAPSULE ORAL 3 TIMES DAILY PRN
Qty: 30 CAPSULE | Refills: 0 | Status: SHIPPED | OUTPATIENT
Start: 2024-12-27

## 2024-12-27 RX ORDER — ALBUTEROL SULFATE 90 UG/1
2 INHALANT RESPIRATORY (INHALATION) EVERY 4 HOURS PRN
Qty: 1 EACH | Refills: 0 | Status: SHIPPED | OUTPATIENT
Start: 2024-12-27

## 2024-12-27 NOTE — TELEPHONE ENCOUNTER
Action Requested: Summary for Provider     []  Critical Lab, Recommendations Needed  [] Need Additional Advice  []   FYI    []   Need Orders  [] Need Medications Sent to Pharmacy  []  Other     SUMMARY:sent to , no appt available , r/o sinus infection, nasal congestion, green phlegm,post nasal drip, sore throat     Reason for call: Sinusitis  Onset:few days                    Reason for Disposition   Sinus congestion (pressure, fullness) present > 10 days    Protocols used: Sinus Pain and Congestion-A-[place]

## 2024-12-28 NOTE — DISCHARGE INSTRUCTIONS
Upper respiratory infection supportive care measures to try as applicable:  General:   - There are multiple viruses that cause similar symptoms, including: Influenza, Rhinovirus, Adenovirus, Coronaviruses (including Covid-19), RSV, parainfluenza, etc.   - Duration of symptoms typically depends on your body's ability to heal itself, which can be affected in many ways including metabolic health, diet, and genetics.    - Symptoms typically last between 7-days to 3-weeks   - Most medications do not not cure the illness, but may help to alleviate your symptoms. However, evidence is not strong.   - Antibiotics are NOT effective for viral illnesses   - Wash hands often   - Disinfect your environment, linens, electronics, etc. to limit viral spread   - Change toothbrush to limit viral spread   - Drink plenty of fluids (water, Pedialyte, etc.)   - Get plenty of rest and sleep with head elevated to help with sinus drainage and throat irritation   - Do not share utensils or drinks   - Alternate Ibuprofen (adult: 600mg) and Tylenol (adult: 650-1000mg) as needed for pain / body aches / fever   - Take a multivitamin and extra Vitamin D (~2000IU) daily, year round   - Prophylactic Vitamin C can help reduce symptoms if you become infected, but is less effective when already ill   - You may benefit from Zinc (~20mg) every day, or every other other day, for a week while sick (Zinc has been shown to kill respiratory viruses)    Sore throat:   - Salt water gargles throughout the day for sore throat   - Cepacol or Ricola lozenges as needed for sore throat    Cough / Sinus:   - Using saline spray or a couple drops into nostrils a couple times a day can help with sinus inflammation (Use nasal spray with nose forward and applicator tip pointed towards outside of eye. Breath normally. You should not feel medication go down your throat.)   - Avoid having air blow on your face as this can worsen congestion / cough   - You may benefit from  placing a garlic clove in each nostril for 10-15min which should irritate sinuses, causing you to get rid of stuck mucus. Blow your nose thoroughly afterwards   - You may benefit from spoonfuls of honey (and/or added to warm drinks) throughout the day for cough   - You may benefit from taking a decongestant (e.g. Sudafed - pseudoephedrine [behind the pharmacy counter]) (may temporarily elevate your heart rate and blood pressure)   - You may benefit from using a humidifier and/or steam showers   - You may benefit from Flonase nasal spray daily (Use with head tilted down and tip pointed towards outside of eye. Breath normally. You should not feel medication go down your throat)   - You may benefit from taking a daily allergy medication (e.g. Zyrtec, Xyzal, etc.)   - You may benefit from boiling water with lemon and cayenne pepper, then breathing in the steam (you can cover your head with a towel to help funnel the steam)

## 2024-12-28 NOTE — H&P
Virtual Telephone Check-In    Franco Golden verbally consents to a Virtual/Telephone Check-In visit on 12/27/24.    Patient understands and accepts financial responsibility for any deductible, co-insurance and/or co-pays associated with this service.    Dr. Cuevas's location is at   The patient's location is at home in [place] and their identity has been established.   The patient understands that we are limiting office visits due to the COVID-19 pandemic and was informed that consent to treat includes permission to submit charges to insurance. It was also shared that without being seen and evaluated in person, there is a risk that the information and/or assessment may be incomplete or inaccurate.    Duration of the service: 11  :  31 PM - 11 : 40  PM.     Past Medical History:    ADD (attention deficit disorder)    Benign skin lesion of forehead    Deep vein thrombosis (HCC)    Esophageal reflux    High blood pressure    History of blood clots    2013 DVT Left leg    History of COVID-19    Unspecified essential hypertension     Allergies[1]    Current Outpatient Medications:     pseudoephedrine 30 MG Oral Tab, Take 1 tablet (30 mg total) by mouth in the morning, at noon, and at bedtime., Disp: 36 tablet, Rfl: 0    Bisoprolol Fumarate 10 MG Oral Tab, Take 1 tablet (10 mg total) by mouth every morning., Disp: 90 tablet, Rfl: 3    fluticasone propionate 50 MCG/ACT Nasal Suspension, 2 sprays by Each Nare route daily. 1-2 weeks than as needed, Disp: 1 each, Rfl: 0    fexofenadine 180 MG Oral Tab, Take 1 tablet (180 mg total) by mouth as needed., Disp: 90 tablet, Rfl: 0    Doxycycline Hyclate 100 MG Oral Tab, Take 1 tablet (100 mg total) by mouth 2 (two) times daily. (Patient not taking: Reported on 4/24/2024), Disp: 14 tablet, Rfl: 0    benzonatate (TESSALON PERLES) 100 MG Oral Cap, Take 1 capsule (100 mg total) by mouth 3 (three) times daily as needed. (Patient not taking: Reported on 1/15/2024), Disp: 30 capsule,  Rfl: 0    albuterol (PROAIR HFA) 108 (90 Base) MCG/ACT Inhalation Aero Soln, Inhale 2 puffs into the lungs every 8 (eight) hours as needed for Wheezing. (Patient not taking: Reported on 1/15/2024), Disp: 1 each, Rfl: 0    methylPREDNISolone (MEDROL) 4 MG Oral Tablet Therapy Pack, Dosepack: take as directed (Patient not taking: Reported on 8/12/2023), Disp: 1 each, Rfl: 0    PEG 3350-KCl-Na Bicarb-NaCl (TRILYTE) 420 g Oral Recon Soln, Take prep as directed by gastro office. May substitute with Trilyte/generic equivalent if needed. (Patient not taking: Reported on 8/12/2023), Disp: 4000 mL, Rfl: 0    saccharomyces boulardii (FLORASTOR) 250 MG Oral Cap, Take 1 capsule (250 mg total) by mouth 2 (two) times daily. (Patient not taking: Reported on 8/12/2023), Disp: 20 capsule, Rfl: 0    Omeprazole 40 MG Oral Capsule Delayed Release, Take 1 capsule (40 mg total) by mouth daily. Before a meal (Patient not taking: Reported on 8/12/2023), Disp: 30 capsule, Rfl: 2    triamterene-hydroCHLOROthiazide (DYAZIDE) 37.5-25 MG Oral Cap, Take 1 capsule by mouth every morning. (Patient not taking: Reported on 8/12/2023), Disp: 90 capsule, Rfl: 1    Albuterol Sulfate  (90 Base) MCG/ACT Inhalation Aero Soln, Inhale 2 puffs into the lungs every 8 (eight) hours as needed for Wheezing or Shortness of Breath. (Patient not taking: Reported on 1/15/2024), Disp: 1 Inhaler, Rfl: 1    clotrimazole-betamethasone 1-0.05 % External Cream, Apply cream on affected area for for 1  Week   Than as needed (Patient not taking: Reported on 8/12/2023), Disp: 60 g, Rfl: 0    Summary of topics discussed: I, Dr. Cuevas, spoke with pt.     Patient complaining of a cough.  Went to the urgent care and he said they did not give him any antibiotics and feels like he has a bacterial infection because the cough is yellow-colored he does complain of burning in chest with coughing.  He is insistent and wants Z-Himanshu and does not want anything else but the Z-Himanshu he  feels like he has a bacterial infection.  Explained that a lot of times viral infection can do this antibiotic for concern with resistance.  But he is insistent he want antibiotics.  He does not know which pharmacy he wants me to send it to.  In [place] he is he said can I find a pharmacy for him.  He is also insisting on inhaler.  He says he is short of breath however he is able to speak in full sentence without taking a breath he does not sound winded over the phone.    Review of Systems:    General: Denies fatigue, chills/fever, night sweats, weight loss, loss of appetite  Neurological: Denies frequent headaches  Cardiovascular: Denies leg swelling, chest pain or pressure after eating or when upset, irregular heart rate/palpitations, dizziness, N,V, exertional arm pain nor neck pain  Patient is alert and oriented x3.  Able to speak in full sentence without short of breath. Alert and oriented on phone, no SOB or wheezing, not anxious  & MS sharp.    Diagnoses and all orders for this visit:    Acute cough    Start antibiotics ASAP and take as directed with food til done. Take probiotics while on antibiotics if can to prevent yeast infections. Increase fluids.  Check CXR. Take antibiotics til done with food. Use albuterol every 3 hrs. For 2 days and as needed and then, as needed. Tessalon perles as needed.     Pt. aware to schedule follow up with OV and doing this telephone visit now due to current covid situation.     Please note that the following visit was completed using a 2 way real-time interactive audio  communication.  This has been done in good safia to provide continuity of care in the best interest of the patient provider relationship, due to ongoing public health crisis, National emergency and because of restrictions of visitation.  There are limitations visit as no physical exam could be performed.  Every conscious effort was taken to allow for sufficient and adequate time.  This billing was spent on  reviewing labs, medications, radiological test and decision making.  Appropriate medical decision making and tests are ordered as detailed in the plan of care above.    Ani Cuevas MD               [1]   Allergies  Allergen Reactions    Cats Claw, Uncaria Tomentosa ITCHING

## 2024-12-28 NOTE — ED PROVIDER NOTES
History     Chief Complaint   Patient presents with    Cough/URI    Sore Throat       Subjective:   HPI    Franco Golden, 59 year old male with notable medical history of obesity, allergic rhinitis, obesity who presents with sore throat and cough. Patient reports s/s started 2-days ago w/o fever, chills, n/v/d, fatigue. Tolerating PO well. Denies known sick contacts. Patient inquiring about strep and Covid testing.        Objective:   Past Medical History:    ADD (attention deficit disorder)    Benign skin lesion of forehead    Deep vein thrombosis (HCC)    Esophageal reflux    High blood pressure    History of blood clots    2013 DVT Left leg    History of COVID-19    Unspecified essential hypertension              Past Surgical History:   Procedure Laterality Date    Electrocardiogram, complete  4/6/2012    Exc skin benig 2.1-3cm face,facial      Repr cmpl wnd head,fac,hand 2.6-7.5                  Social History     Socioeconomic History    Marital status:    Tobacco Use    Smoking status: Never     Passive exposure: Never    Smokeless tobacco: Never   Vaping Use    Vaping status: Never Used   Substance and Sexual Activity    Alcohol use: Yes     Alcohol/week: 0.0 standard drinks of alcohol     Comment: 1-2 beers/week, last alcohol use was 2 weeks ago    Drug use: No   Other Topics Concern    Caffeine Concern Yes     Comment: coffee, tea, 2 cups daily              Medications Ordered Prior to Encounter[1]      Review of Systems   HENT:  Positive for sore throat.    Respiratory:  Positive for cough.    All other systems reviewed and are negative.        Constitutional and vital signs reviewed.      All other systems reviewed and negative except as noted above.    I have reviewed the family history, social history, allergies, and outpatient medications.     History reviewed from EMR: Encounters, problem list, allergies, medications      Physical Exam     ED Triage Vitals [12/27/24 1831]   /86    Pulse 65   Resp 18   Temp 97.2 °F (36.2 °C)   Temp src Oral   SpO2 96 %   O2 Device None (Room air)       Current:/86   Pulse 65   Temp 97.2 °F (36.2 °C) (Oral)   Resp 18   Ht 182.9 cm (6')   Wt 115.7 kg   SpO2 96%   BMI 34.58 kg/m²       Physical Exam  Vitals and nursing note reviewed.   Constitutional:       General: He is not in acute distress.     Appearance: Normal appearance. He is obese. He is not ill-appearing or toxic-appearing.   HENT:      Head: Normocephalic and atraumatic.      Right Ear: External ear normal.      Left Ear: External ear normal.      Nose: Nose normal. No congestion or rhinorrhea.      Mouth/Throat:      Mouth: Mucous membranes are moist.      Pharynx: Uvula midline. No pharyngeal swelling or oropharyngeal exudate.      Tonsils: No tonsillar exudate. 1+ on the right. 1+ on the left.   Eyes:      Extraocular Movements: Extraocular movements intact.      Conjunctiva/sclera: Conjunctivae normal.      Pupils: Pupils are equal, round, and reactive to light.   Cardiovascular:      Rate and Rhythm: Normal rate and regular rhythm.      Pulses: Normal pulses.      Heart sounds: Normal heart sounds.   Pulmonary:      Effort: Pulmonary effort is normal. No respiratory distress.      Comments: LS clear throughout  Musculoskeletal:         General: No swelling, tenderness or signs of injury. Normal range of motion.      Cervical back: Normal range of motion.   Skin:     General: Skin is warm and dry.      Capillary Refill: Capillary refill takes less than 2 seconds.   Neurological:      General: No focal deficit present.      Mental Status: He is alert and oriented to person, place, and time. Mental status is at baseline.   Psychiatric:         Mood and Affect: Mood normal.         Behavior: Behavior normal.         Thought Content: Thought content normal.         Judgment: Judgment normal.            ED Course     Labs Reviewed   POCT RAPID STREP - Normal   RAPID SARS-COV-2 BY PCR -  Normal     No orders to display       Vitals:    12/27/24 1831   BP: 143/86   Pulse: 65   Resp: 18   Temp: 97.2 °F (36.2 °C)   TempSrc: Oral   SpO2: 96%   Weight: 115.7 kg   Height: 182.9 cm (6')            CARA        Franco Golden, 59 year old male with medical history as noted above who presents with cough, sore throat   - Patient in NAD, VSS   - viral vs strep vs PNA vs other   - Benign cardiopulmonary exam; less likely PNA   - Covid and strep swabs obtained   - Patient asking about getting an antibiotic \"just in case\". Advised that antibiotics are not indicated for symptoms r/t viral etiology, but that if his strep test is positive he would get antibiotics.        ** See ED course below for additional information on care provided / interventions / notable events throughout patient's encounter.    ED Course as of 12/27/24 1927  ------------------------------------------------------------  Time: 12/27 1926  Comment: Covid and strep negative  Supportive care and infection control measures discussed  Follow up with primary care provider as needed         ** I have independently reviewed the radiology images, clinical lab results, and ECG tracings as described above (if applicable)    ** Concerning co-morbidities possibly affecting complaint / care: n/a    ** See disposition & plan section below for home care instructions - if applicable        Medical Decision Making  Amount and/or Complexity of Data Reviewed  Labs: ordered. Decision-making details documented in ED Course.    Risk  OTC drugs.        Disposition and Plan     Clinical Impression:  1. Symptoms of upper respiratory infection (URI)    2. Throat irritation         Disposition:  Discharge  12/27/2024  7:26 pm    Follow-up:  Lesly Gonzalez MD  [address] [place] [address]  [phone]      As needed          Medications Prescribed:  Current Discharge Medication List             Home care instructions:      Upper respiratory infection supportive  care measures to try as applicable:  General:   - There are multiple viruses that cause similar symptoms, including: Influenza, Rhinovirus, Adenovirus, Coronaviruses (including Covid-19), RSV, parainfluenza, etc.   - Duration of symptoms typically depends on your body's ability to heal itself, which can be affected in many ways including metabolic health, diet, and genetics.    - Symptoms typically last between 7-days to 3-weeks   - Most medications do not not cure the illness, but may help to alleviate your symptoms. However, evidence is not strong.   - Antibiotics are NOT effective for viral illnesses   - Wash hands often   - Disinfect your environment, linens, electronics, etc. to limit viral spread   - Change toothbrush to limit viral spread   - Drink plenty of fluids (water, Pedialyte, etc.)   - Get plenty of rest and sleep with head elevated to help with sinus drainage and throat irritation   - Do not share utensils or drinks   - Alternate Ibuprofen (adult: 600mg) and Tylenol (adult: 650-1000mg) as needed for pain / body aches / fever   - Take a multivitamin and extra Vitamin D (~2000IU) daily, year round   - Prophylactic Vitamin C can help reduce symptoms if you become infected, but is less effective when already ill   - You may benefit from Zinc (~20mg) every day, or every other other day, for a week while sick (Zinc has been shown to kill respiratory viruses)    Sore throat:   - Salt water gargles throughout the day for sore throat   - Cepacol or Ricola lozenges as needed for sore throat    Cough / Sinus:   - Using saline spray or a couple drops into nostrils a couple times a day can help with sinus inflammation (Use nasal spray with nose forward and applicator tip pointed towards outside of eye. Breath normally. You should not feel medication go down your throat.)   - Avoid having air blow on your face as this can worsen congestion / cough   - You may benefit from placing a garlic clove in each nostril for  10-15min which should irritate sinuses, causing you to get rid of stuck mucus. Blow your nose thoroughly afterwards   - You may benefit from spoonfuls of honey (and/or added to warm drinks) throughout the day for cough   - You may benefit from taking a decongestant (e.g. Sudafed - pseudoephedrine [behind the pharmacy counter]) (may temporarily elevate your heart rate and blood pressure)   - You may benefit from using a humidifier and/or steam showers   - You may benefit from Flonase nasal spray daily (Use with head tilted down and tip pointed towards outside of eye. Breath normally. You should not feel medication go down your throat)   - You may benefit from taking a daily allergy medication (e.g. Zyrtec, Xyzal, etc.)   - You may benefit from boiling water with lemon and cayenne pepper, then breathing in the steam (you can cover your head with a towel to help funnel the steam)        Nahum Landrum, DNP, APRN, AGACNP-BC, FNP-C, CNL  Adult-Gerontology Acute Care & Family Nurse Practitioner  [facility]        The above patient (and/or guardian) was made aware that an appropriate evaluation has been performed, and that no additional testing is required at this time. In my medical judgment, there is currently no evidence of an immediate life-threatening or surgical condition, therefore discharge is indicated at this time. The patient (and/or guardian) was advised that a small risk still exists that a serious condition could develop. The patient was instructed to arrange close follow-up with their primary care provider (or the referral provider given today). The patient received written and verbal instructions regarding their condition / concerns, demonstrated understanding, and is agreement with the outpatient treatment plan.            [1]   No current facility-administered medications on file prior to encounter.     Current Outpatient Medications on File Prior to Encounter   Medication Sig Dispense  Refill    [] benzonatate 100 MG Oral Cap Take 1 capsule (100 mg total) by mouth 3 (three) times daily as needed for cough. 30 capsule 0    [] oxymetazoline 0.05 % Nasal Solution 1 spray by Nasal route 2 (two) times daily. (Patient not taking: Reported on 10/8/2024) 15 mL 0    pseudoephedrine 30 MG Oral Tab Take 1 tablet (30 mg total) by mouth in the morning, at noon, and at bedtime. 36 tablet 0    [] albuterol 108 (90 Base) MCG/ACT Inhalation Aero Soln Inhale 2 puffs into the lungs every 4 (four) hours as needed for Wheezing. 1 each 0    Bisoprolol Fumarate 10 MG Oral Tab Take 1 tablet (10 mg total) by mouth every morning. 90 tablet 3    fluticasone propionate 50 MCG/ACT Nasal Suspension 2 sprays by Each Nare route daily. 1-2 weeks than as needed 1 each 0    fexofenadine 180 MG Oral Tab Take 1 tablet (180 mg total) by mouth as needed. 90 tablet 0    Doxycycline Hyclate 100 MG Oral Tab Take 1 tablet (100 mg total) by mouth 2 (two) times daily. (Patient not taking: Reported on 2024) 14 tablet 0    benzonatate (TESSALON PERLES) 100 MG Oral Cap Take 1 capsule (100 mg total) by mouth 3 (three) times daily as needed. (Patient not taking: Reported on 1/15/2024) 30 capsule 0    albuterol (PROAIR HFA) 108 (90 Base) MCG/ACT Inhalation Aero Soln Inhale 2 puffs into the lungs every 8 (eight) hours as needed for Wheezing. (Patient not taking: Reported on 1/15/2024) 1 each 0    methylPREDNISolone (MEDROL) 4 MG Oral Tablet Therapy Pack Dosepack: take as directed (Patient not taking: Reported on 2023) 1 each 0    PEG 3350-KCl-Na Bicarb-NaCl (TRILYTE) 420 g Oral Recon Soln Take prep as directed by gastro office. May substitute with Trilyte/generic equivalent if needed. (Patient not taking: Reported on 2023) 4000 mL 0    saccharomyces boulardii (FLORASTOR) 250 MG Oral Cap Take 1 capsule (250 mg total) by mouth 2 (two) times daily. (Patient not taking: Reported on 2023) 20 capsule 0     Omeprazole 40 MG Oral Capsule Delayed Release Take 1 capsule (40 mg total) by mouth daily. Before a meal (Patient not taking: Reported on 8/12/2023) 30 capsule 2    triamterene-hydroCHLOROthiazide (DYAZIDE) 37.5-25 MG Oral Cap Take 1 capsule by mouth every morning. (Patient not taking: Reported on 8/12/2023) 90 capsule 1    Albuterol Sulfate  (90 Base) MCG/ACT Inhalation Aero Soln Inhale 2 puffs into the lungs every 8 (eight) hours as needed for Wheezing or Shortness of Breath. (Patient not taking: Reported on 1/15/2024) 1 Inhaler 1    clotrimazole-betamethasone 1-0.05 % External Cream Apply cream on affected area for for 1  Week   Than as needed (Patient not taking: Reported on 8/12/2023) 60 g 0

## 2025-01-08 ENCOUNTER — TELEMEDICINE (OUTPATIENT)
Dept: INTERNAL MEDICINE CLINIC | Facility: CLINIC | Age: 60
End: 2025-01-08
Payer: COMMERCIAL

## 2025-01-08 DIAGNOSIS — R06.2 WHEEZING: ICD-10-CM

## 2025-01-08 DIAGNOSIS — R05.1 ACUTE COUGH: Primary | ICD-10-CM

## 2025-01-08 PROCEDURE — 98004 SYNCH AUDIO-VIDEO EST SF 10: CPT | Performed by: INTERNAL MEDICINE

## 2025-01-08 RX ORDER — ALBUTEROL SULFATE 90 UG/1
2 INHALANT RESPIRATORY (INHALATION) EVERY 4 HOURS PRN
Qty: 1 EACH | Refills: 0 | Status: SHIPPED | OUTPATIENT
Start: 2025-01-08

## 2025-01-08 NOTE — PROGRESS NOTES
Patient ID: Franco Golden is a 59 year old male.  Chief Complaint   Patient presents with   • Sick Call          HISTORY OF PRESENT ILLNESS:   Patient presents for above.  This visit is conducted using Telemedicine with live, interactive video and audio.  Patient diagnosed retraction on 12/27/2024.  Antibiotics which she has completed.  He was feeling better but now again feeling more fatigued with more cough.  Admittedly has been doing more activity as well since he improved.  No documented fevers.  Not aware of any sick contacts.  He was also given Tessalon Perles but has not taken any.    Review of Systems   Constitutional: Negative.    HENT: Negative.     Eyes: Negative.    Respiratory:  Positive for cough.    Cardiovascular: Negative.    Gastrointestinal: Negative.    Endocrine: Negative.    Genitourinary: Negative.    Musculoskeletal: Negative.    Skin: Negative.    Allergic/Immunologic: Negative.    Neurological: Negative.    Hematological: Negative.    Psychiatric/Behavioral: Negative.        MEDICAL HISTORY:     Past Medical History:   • ADD (attention deficit disorder)   • Benign skin lesion of forehead   • Deep vein thrombosis (HCC)   • Esophageal reflux   • High blood pressure   • History of blood clots    2013 DVT Left leg   • History of COVID-19   • Unspecified essential hypertension       Past Surgical History:   Procedure Laterality Date   • Electrocardiogram, complete  4/6/2012   • Exc skin benig 2.1-3cm face,facial     • Repr cmpl wnd head,fac,hand 2.6-7.5           Current Outpatient Medications:   •  albuterol (PROAIR HFA) 108 (90 Base) MCG/ACT Inhalation Aero Soln, Inhale 2 puffs into the lungs every 4 (four) hours as needed for Wheezing., Disp: 1 each, Rfl: 0  •  benzonatate (TESSALON PERLES) 100 MG Oral Cap, Take 1 capsule (100 mg total) by mouth 3 (three) times daily as needed for cough., Disp: 30 capsule, Rfl: 0  •  pseudoephedrine 30 MG Oral Tab, Take 1 tablet (30 mg total) by mouth in the  morning, at noon, and at bedtime., Disp: 36 tablet, Rfl: 0  •  Bisoprolol Fumarate 10 MG Oral Tab, Take 1 tablet (10 mg total) by mouth every morning., Disp: 90 tablet, Rfl: 3  •  fluticasone propionate 50 MCG/ACT Nasal Suspension, 2 sprays by Each Nare route daily. 1-2 weeks than as needed, Disp: 1 each, Rfl: 0  •  fexofenadine 180 MG Oral Tab, Take 1 tablet (180 mg total) by mouth as needed., Disp: 90 tablet, Rfl: 0  •  Doxycycline Hyclate 100 MG Oral Tab, Take 1 tablet (100 mg total) by mouth 2 (two) times daily. (Patient not taking: Reported on 4/24/2024), Disp: 14 tablet, Rfl: 0  •  methylPREDNISolone (MEDROL) 4 MG Oral Tablet Therapy Pack, Dosepack: take as directed (Patient not taking: Reported on 8/12/2023), Disp: 1 each, Rfl: 0  •  PEG 3350-KCl-Na Bicarb-NaCl (TRILYTE) 420 g Oral Recon Soln, Take prep as directed by gastro office. May substitute with Trilyte/generic equivalent if needed. (Patient not taking: Reported on 8/12/2023), Disp: 4000 mL, Rfl: 0  •  saccharomyces boulardii (FLORASTOR) 250 MG Oral Cap, Take 1 capsule (250 mg total) by mouth 2 (two) times daily. (Patient not taking: Reported on 8/12/2023), Disp: 20 capsule, Rfl: 0  •  Omeprazole 40 MG Oral Capsule Delayed Release, Take 1 capsule (40 mg total) by mouth daily. Before a meal (Patient not taking: Reported on 8/12/2023), Disp: 30 capsule, Rfl: 2  •  triamterene-hydroCHLOROthiazide (DYAZIDE) 37.5-25 MG Oral Cap, Take 1 capsule by mouth every morning. (Patient not taking: Reported on 8/12/2023), Disp: 90 capsule, Rfl: 1  •  clotrimazole-betamethasone 1-0.05 % External Cream, Apply cream on affected area for for 1  Week   Than as needed (Patient not taking: Reported on 8/12/2023), Disp: 60 g, Rfl: 0    Allergies:Allergies[1]    Social History     Socioeconomic History   • Marital status:      Spouse name: Not on file   • Number of children: Not on file   • Years of education: Not on file   • Highest education level: Not on file    Occupational History   • Not on file   Tobacco Use   • Smoking status: Never     Passive exposure: Never   • Smokeless tobacco: Never   Vaping Use   • Vaping status: Never Used   Substance and Sexual Activity   • Alcohol use: Yes     Alcohol/week: 0.0 standard drinks of alcohol     Comment: 1-2 beers/week, last alcohol use was 2 weeks ago   • Drug use: No   • Sexual activity: Not on file   Other Topics Concern   •  Service Not Asked   • Blood Transfusions Not Asked   • Caffeine Concern Yes     Comment: coffee, tea, 2 cups daily   • Occupational Exposure Not Asked   • Hobby Hazards Not Asked   • Sleep Concern Not Asked   • Stress Concern Not Asked   • Weight Concern Not Asked   • Special Diet Not Asked   • Back Care Not Asked   • Exercise Not Asked   • Bike Helmet Not Asked   • Seat Belt Not Asked   • Self-Exams Not Asked   Social History Narrative   • Not on file     Social Drivers of Health     Financial Resource Strain: Not on file   Food Insecurity: Not on file   Transportation Needs: Not on file   Physical Activity: Not on file   Stress: Not on file   Social Connections: Not on file   Housing Stability: Not on file       PHYSICAL EXAM:   Unable to perform vitals or do physical exam as this is a virtual video visit.  Patient appears alert.  No conversational dyspnea or distress.    ASSESSMENT/PLAN:   1. Acute cough  albuterol (PROAIR HFA) 108 (90 Base) MCG/ACT Inhalation Aero Soln; Inhale 2 puffs into the lungs every 4 (four) hours as needed for Wheezing.  Dispense: 1 each; Refill: 0  Take Tessalon Perles as prescribed.  Symptomatic treatment.    2. Wheezing  albuterol (PROAIR HFA) 108 (90 Base) MCG/ACT Inhalation Aero Soln; Inhale 2 puffs into the lungs every 4 (four) hours as needed for Wheezing.  Dispense: 1 each; Refill: 0  As per #1.    Return if symptoms worsen or fail to improve.    Time spent on encounter  15 minutes             Franco Golden understands video evaluation is not a substitute for  face-to-face examination or emergency care. Patient advised to go to ER or call 911 for worsening symptoms or acute distress.     Telehealth outside of [facility]  Telehealth Verbal Consent   I conducted a telehealth visit with Franco Golden today, 01/08/25, which was completed using two-way, real-time interactive audio and video communication. This has been done in good safia to provide continuity of care in the best interest of the provider-patient relationship, due to the COVID -19 public health crisis/national emergency where restrictions of face-to-face office visits are ongoing. Every conscious effort was taken to allow for sufficient and adequate time to complete the visit.  The patient was made aware of the limitations of the telehealth visit, including treatment limitations as no physical exam could be performed.  The patient was advised to call 911 or to go to the ER in case there was an emergency.  The patient was also advised of the potential privacy & security concerns related to the telehealth platform.   The patient was made aware of where to find [facility]'s notice of privacy practices, telehealth consent form and other related consent forms and documents.  which are located on the [facility] website. The patient verbally agreed to telehealth consent form, related consents and the risks discussed.    Lastly, the patient confirmed that they were in [place].   Included in this visit, time may have been spent reviewing labs, medications, radiology tests and decision making. Appropriate medical decision-making and tests are ordered as detailed in the plan of care above.  Coding/billing information is submitted for this visit based on complexity of care and/or time spent for the visit.    This note was prepared using Dragon Medical voice recognition dictation software. As a result errors may occur. When identified these errors have been corrected. While every attempt is made to correct errors during dictation  discrepancies may still exist.    Moiz Perez MD  1/8/2025       [1]   Allergies  Allergen Reactions   • Cats Claw, Uncaria Tomentosa ITCHING

## 2025-01-09 RX ORDER — AZITHROMYCIN 250 MG/1
TABLET, FILM COATED ORAL
Qty: 6 TABLET | Refills: 0 | OUTPATIENT
Start: 2025-01-09 | End: 2025-01-14

## 2025-01-09 NOTE — TELEPHONE ENCOUNTER
Azithromycin: short term antibiotic- sent patient triage message to call office if having symptoms

## 2025-05-02 ENCOUNTER — OFFICE VISIT (OUTPATIENT)
Dept: INTERNAL MEDICINE CLINIC | Facility: CLINIC | Age: 60
End: 2025-05-02

## 2025-05-02 ENCOUNTER — HOSPITAL ENCOUNTER (OUTPATIENT)
Dept: ULTRASOUND IMAGING | Facility: HOSPITAL | Age: 60
Discharge: HOME | End: 2025-05-02
Attending: INTERNAL MEDICINE
Payer: COMMERCIAL

## 2025-05-02 VITALS
OXYGEN SATURATION: 98 % | WEIGHT: 259.19 LBS | BODY MASS INDEX: 35.11 KG/M2 | HEIGHT: 72 IN | HEART RATE: 68 BPM | TEMPERATURE: 97 F | SYSTOLIC BLOOD PRESSURE: 155 MMHG | RESPIRATION RATE: 18 BRPM | DIASTOLIC BLOOD PRESSURE: 90 MMHG

## 2025-05-02 DIAGNOSIS — M79.89 PAIN AND SWELLING OF LOWER LEG, LEFT: Primary | ICD-10-CM

## 2025-05-02 DIAGNOSIS — M79.662 PAIN AND SWELLING OF LOWER LEG, LEFT: Primary | ICD-10-CM

## 2025-05-02 DIAGNOSIS — M79.89 PAIN AND SWELLING OF LOWER LEG, LEFT: ICD-10-CM

## 2025-05-02 DIAGNOSIS — M79.662 PAIN AND SWELLING OF LOWER LEG, LEFT: ICD-10-CM

## 2025-05-02 PROCEDURE — 99214 OFFICE O/P EST MOD 30 MIN: CPT | Performed by: INTERNAL MEDICINE

## 2025-05-02 PROCEDURE — 93971 EXTREMITY STUDY: CPT | Performed by: INTERNAL MEDICINE

## 2025-05-02 NOTE — PROGRESS NOTES
Subjective:     Patient ID: Franco Golden is a 60 year old male.    HPI    History/Other:   He came in today complaining of left leg node and pain.  According to the patient he noticed yesterday morning like tightening on his calf muscle.  It was similar to his previous episode when he had a blood clot in 2017.  He denies any injury.  He states that today he was rushing so he did not take his blood pressure medication  He also states that he has ringing in his right ear which is ongoing for couple of months  Review of Systems   Constitutional: Negative.    HENT: Negative.     Eyes: Negative.    Respiratory: Negative.     Cardiovascular: Negative.    Gastrointestinal: Negative.    Endocrine: Negative.    Genitourinary: Negative.    Musculoskeletal: Negative.    Neurological: Negative.    Hematological: Negative.    Psychiatric/Behavioral: Negative.       Current Medications[1]  Allergies:Allergies[2]    Past Medical History[3]   Past Surgical History[4]   Family History[5]   Social History: Short Social Hx on File[6]     Objective:   Physical Exam  Vitals and nursing note reviewed.   Constitutional:       Appearance: Normal appearance.   HENT:      Head: Normocephalic and atraumatic.   Cardiovascular:      Rate and Rhythm: Normal rate and regular rhythm.      Pulses: Normal pulses.      Heart sounds: Normal heart sounds.   Musculoskeletal:         General: Normal range of motion.      Cervical back: Normal range of motion and neck supple.   Skin:     General: Skin is warm.   Neurological:      Mental Status: He is alert.         Assessment & Plan:   1. Pain and swelling of lower leg, left    Will order stat ultrasound venous Doppler    2 hypertension blood pressure noted on the higher side I did advise him to take his medications regularly monitor at home and follow-up with PCP with blood pressure readings within 2 to 3 weeks  3 right ear tinnitus follow-up with ENT    No orders of the defined types were placed in  this encounter.      Meds This Visit:  Requested Prescriptions      No prescriptions requested or ordered in this encounter       Imaging & Referrals:  US VENOUS DOPPLER LEG LEFT - DIAG IMG (CPT=93971)            [1]   Current Outpatient Medications   Medication Sig Dispense Refill    albuterol (PROAIR HFA) 108 (90 Base) MCG/ACT Inhalation Aero Soln Inhale 2 puffs into the lungs every 4 (four) hours as needed for Wheezing. 1 each 0    benzonatate (TESSALON PERLES) 100 MG Oral Cap Take 1 capsule (100 mg total) by mouth 3 (three) times daily as needed for cough. 30 capsule 0    pseudoephedrine 30 MG Oral Tab Take 1 tablet (30 mg total) by mouth in the morning, at noon, and at bedtime. 36 tablet 0    Bisoprolol Fumarate 10 MG Oral Tab Take 1 tablet (10 mg total) by mouth every morning. 90 tablet 3    fexofenadine 180 MG Oral Tab Take 1 tablet (180 mg total) by mouth as needed. 90 tablet 0    Doxycycline Hyclate 100 MG Oral Tab Take 1 tablet (100 mg total) by mouth 2 (two) times daily. 14 tablet 0    methylPREDNISolone (MEDROL) 4 MG Oral Tablet Therapy Pack Dosepack: take as directed 1 each 0    PEG 3350-KCl-Na Bicarb-NaCl (TRILYTE) 420 g Oral Recon Soln Take prep as directed by gastro office. May substitute with Trilyte/generic equivalent if needed. 4000 mL 0    saccharomyces boulardii (FLORASTOR) 250 MG Oral Cap Take 1 capsule (250 mg total) by mouth 2 (two) times daily. 20 capsule 0    Omeprazole 40 MG Oral Capsule Delayed Release Take 1 capsule (40 mg total) by mouth daily. Before a meal 30 capsule 2    triamterene-hydroCHLOROthiazide (DYAZIDE) 37.5-25 MG Oral Cap Take 1 capsule by mouth every morning. 90 capsule 1    clotrimazole-betamethasone 1-0.05 % External Cream Apply cream on affected area for for 1  Week   Than as needed 60 g 0   [2]   Allergies  Allergen Reactions    Cats Claw, Uncaria Tomentosa ITCHING   [3]   Past Medical History:   ADD (attention deficit disorder)    Benign skin lesion of forehead     Deep vein thrombosis (HCC)    Esophageal reflux    High blood pressure    History of blood clots    2013 DVT Left leg    History of COVID-19    Unspecified essential hypertension   [4]   Past Surgical History:  Procedure Laterality Date    Electrocardiogram, complete  4/6/2012    Exc skin benig 2.1-3cm face,facial      Repr cmpl wnd head,fac,hand 2.6-7.5     [5]   Family History  Problem Relation Age of Onset    Melanoma Other         malignant, relative?    Hypertension Paternal Grandmother     Hypertension Brother     Hypertension Father     Hypertension Mother     Glaucoma Neg     Macular degeneration Neg     Diabetes Neg    [6]   Social History  Socioeconomic History    Marital status:    Tobacco Use    Smoking status: Never     Passive exposure: Never    Smokeless tobacco: Never   Vaping Use    Vaping status: Never Used   Substance and Sexual Activity    Alcohol use: Yes     Alcohol/week: 0.0 standard drinks of alcohol     Comment: 1-2 beers/week, last alcohol use was 2 weeks ago    Drug use: No   Other Topics Concern    Caffeine Concern Yes     Comment: coffee, tea, 2 cups daily

## 2025-05-08 ENCOUNTER — OFFICE VISIT (OUTPATIENT)
Dept: INTERNAL MEDICINE CLINIC | Facility: CLINIC | Age: 60
End: 2025-05-08

## 2025-05-08 VITALS
WEIGHT: 255 LBS | OXYGEN SATURATION: 96 % | HEIGHT: 72 IN | RESPIRATION RATE: 18 BRPM | TEMPERATURE: 99 F | SYSTOLIC BLOOD PRESSURE: 128 MMHG | BODY MASS INDEX: 34.54 KG/M2 | HEART RATE: 62 BPM | DIASTOLIC BLOOD PRESSURE: 80 MMHG

## 2025-05-08 DIAGNOSIS — H00.012 HORDEOLUM EXTERNUM OF RIGHT LOWER EYELID: Primary | ICD-10-CM

## 2025-05-08 PROCEDURE — 99214 OFFICE O/P EST MOD 30 MIN: CPT | Performed by: INTERNAL MEDICINE

## 2025-05-08 NOTE — PROGRESS NOTES
Subjective:     Patient ID: Franco Golden is a 60 year old male.    Eye Problem         History/Other: He came in today for follow-up after he was diagnosed with a left leg DVT.  He is taking his medications regularly.  He needs a note to go back to work.  He did not schedule appointment to see hematology yet    He is also complaining of a stye on his left eye that is present for the last couple of days it is on the lower eyelid  Review of Systems   Constitutional: Negative.    HENT: Negative.     Eyes: Negative.    Respiratory: Negative.     Cardiovascular: Negative.    Gastrointestinal: Negative.    Endocrine: Negative.    Genitourinary: Negative.    Musculoskeletal: Negative.    Skin: Negative.    Neurological: Negative.    Hematological: Negative.    Psychiatric/Behavioral: Negative.       Current Medications[1]  Allergies:Allergies[2]    Past Medical History[3]   Past Surgical History[4]   Family History[5]   Social History: Short Social Hx on File[6]     Objective:   Physical Exam  Vitals and nursing note reviewed.   Constitutional:       Appearance: Normal appearance.   HENT:      Head: Normocephalic and atraumatic.   Cardiovascular:      Rate and Rhythm: Normal rate and regular rhythm.      Pulses: Normal pulses.      Heart sounds: Normal heart sounds.   Pulmonary:      Breath sounds: Normal breath sounds.   Abdominal:      Palpations: Abdomen is soft.   Musculoskeletal:      Cervical back: Normal range of motion and neck supple.   Skin:     General: Skin is warm.   Neurological:      Mental Status: He is alert. Mental status is at baseline.   Psychiatric:         Mood and Affect: Mood normal.         Assessment & Plan:   No diagnosis found.  Left lower eyelid stye I did advise him to use warm compresses  Left leg DVT continue with Eliquis I did advise him to schedule appointment to see hematology due to history of recurrent DVT  No orders of the defined types were placed in this encounter.      Meds This  Visit:  Requested Prescriptions      No prescriptions requested or ordered in this encounter       Imaging & Referrals:  None            [1]   Current Outpatient Medications   Medication Sig Dispense Refill    apixaban (ELIQUIS) 5 MG Oral Tab Take 10 mg bid for 7  days , then 5 mg bid 90 tablet 0    albuterol (PROAIR HFA) 108 (90 Base) MCG/ACT Inhalation Aero Soln Inhale 2 puffs into the lungs every 4 (four) hours as needed for Wheezing. 1 each 0    Bisoprolol Fumarate 10 MG Oral Tab Take 1 tablet (10 mg total) by mouth every morning. 90 tablet 3    clotrimazole-betamethasone 1-0.05 % External Cream Apply cream on affected area for for 1  Week   Than as needed 60 g 0    benzonatate (TESSALON PERLES) 100 MG Oral Cap Take 1 capsule (100 mg total) by mouth 3 (three) times daily as needed for cough. 30 capsule 0    pseudoephedrine 30 MG Oral Tab Take 1 tablet (30 mg total) by mouth in the morning, at noon, and at bedtime. 36 tablet 0    fexofenadine 180 MG Oral Tab Take 1 tablet (180 mg total) by mouth as needed. (Patient not taking: Reported on 5/8/2025) 90 tablet 0    Doxycycline Hyclate 100 MG Oral Tab Take 1 tablet (100 mg total) by mouth 2 (two) times daily. 14 tablet 0    methylPREDNISolone (MEDROL) 4 MG Oral Tablet Therapy Pack Dosepack: take as directed 1 each 0    PEG 3350-KCl-Na Bicarb-NaCl (TRILYTE) 420 g Oral Recon Soln Take prep as directed by gastro office. May substitute with Trilyte/generic equivalent if needed. (Patient not taking: Reported on 5/8/2025) 4000 mL 0    saccharomyces boulardii (FLORASTOR) 250 MG Oral Cap Take 1 capsule (250 mg total) by mouth 2 (two) times daily. 20 capsule 0    Omeprazole 40 MG Oral Capsule Delayed Release Take 1 capsule (40 mg total) by mouth daily. Before a meal (Patient not taking: Reported on 5/8/2025) 30 capsule 2    triamterene-hydroCHLOROthiazide (DYAZIDE) 37.5-25 MG Oral Cap Take 1 capsule by mouth every morning. 90 capsule 1   [2]   Allergies  Allergen Reactions     Cats Claw, Uncaria Tomentosa ITCHING   [3]   Past Medical History:   ADD (attention deficit disorder)    Benign skin lesion of forehead    Deep vein thrombosis (HCC)    Esophageal reflux    High blood pressure    History of blood clots    2013 DVT Left leg    History of COVID-19    Unspecified essential hypertension   [4]   Past Surgical History:  Procedure Laterality Date    Electrocardiogram, complete  4/6/2012    Exc skin benig 2.1-3cm face,facial      Repr cmpl wnd head,fac,hand 2.6-7.5     [5]   Family History  Problem Relation Age of Onset    Melanoma Other         malignant, relative?    Hypertension Paternal Grandmother     Hypertension Brother     Hypertension Father     Hypertension Mother     Glaucoma Neg     Macular degeneration Neg     Diabetes Neg    [6]   Social History  Socioeconomic History    Marital status:    Tobacco Use    Smoking status: Never     Passive exposure: Never    Smokeless tobacco: Never   Vaping Use    Vaping status: Never Used   Substance and Sexual Activity    Alcohol use: Yes     Alcohol/week: 0.0 standard drinks of alcohol     Comment: 1-2 beers/week, last alcohol use was 2 weeks ago    Drug use: No   Other Topics Concern    Caffeine Concern Yes     Comment: coffee, tea, 2 cups daily

## 2025-06-05 ENCOUNTER — OFFICE VISIT (OUTPATIENT)
Dept: INTERNAL MEDICINE CLINIC | Facility: CLINIC | Age: 60
End: 2025-06-05

## 2025-06-05 VITALS
OXYGEN SATURATION: 94 % | SYSTOLIC BLOOD PRESSURE: 133 MMHG | TEMPERATURE: 98 F | BODY MASS INDEX: 34.67 KG/M2 | WEIGHT: 256 LBS | HEART RATE: 67 BPM | DIASTOLIC BLOOD PRESSURE: 86 MMHG | HEIGHT: 72 IN

## 2025-06-05 DIAGNOSIS — J45.21 EXACERBATION OF INTERMITTENT ASTHMA, UNSPECIFIED ASTHMA SEVERITY (HCC): Primary | ICD-10-CM

## 2025-06-05 DIAGNOSIS — J02.0 STREP SORE THROAT: ICD-10-CM

## 2025-06-05 LAB
CONTROL LINE PRESENT WITH A CLEAR BACKGROUND (YES/NO): YES YES/NO
KIT LOT #: NORMAL NUMERIC

## 2025-06-05 PROCEDURE — 87880 STREP A ASSAY W/OPTIC: CPT | Performed by: NURSE PRACTITIONER

## 2025-06-05 PROCEDURE — 99214 OFFICE O/P EST MOD 30 MIN: CPT | Performed by: NURSE PRACTITIONER

## 2025-06-05 RX ORDER — PREDNISONE 20 MG/1
20 TABLET ORAL 2 TIMES DAILY
Qty: 10 TABLET | Refills: 0 | Status: SHIPPED | OUTPATIENT
Start: 2025-06-05 | End: 2025-06-10

## 2025-06-05 RX ORDER — BENZONATATE 200 MG/1
200 CAPSULE ORAL 3 TIMES DAILY PRN
Qty: 30 CAPSULE | Refills: 0 | Status: SHIPPED | OUTPATIENT
Start: 2025-06-05

## 2025-06-05 RX ORDER — ALBUTEROL SULFATE 90 UG/1
2 INHALANT RESPIRATORY (INHALATION) EVERY 4 HOURS PRN
Qty: 1 EACH | Refills: 2 | Status: SHIPPED | OUTPATIENT
Start: 2025-06-05

## 2025-06-05 RX ORDER — AZITHROMYCIN 250 MG/1
TABLET, FILM COATED ORAL
Qty: 6 TABLET | Refills: 0 | Status: SHIPPED | OUTPATIENT
Start: 2025-06-05 | End: 2025-06-10

## 2025-06-05 NOTE — PROGRESS NOTES
Franco Golden is a 60 year old male.  HPI:   Pt reports cold sx that started one week ago. He is a 61 y/o male with hx of asthma, DVT of LLE on eliquis, HLD, HTN. Pt reports some wheezing, cough is both wet and dry, sore throat. Has not been using his albuterol inhaler.  Throat reported as improved.  Denies any CP, SOB, dizziness, fever, chills, appetite or weight changes, palpitations. Did not test for covid.   Current Medications[1]   Past Medical History[2]   Social History:  Short Social Hx on File[3]     REVIEW OF SYSTEMS:   Review of Systems   Constitutional:  Negative for activity change, appetite change, chills, diaphoresis, fatigue, fever and unexpected weight change.   HENT:  Positive for congestion and sore throat. Negative for ear pain, postnasal drip, rhinorrhea, sinus pressure, sinus pain, sneezing, tinnitus, trouble swallowing and voice change.    Eyes:  Negative for photophobia, discharge and visual disturbance.   Respiratory:  Negative for cough, chest tightness, shortness of breath and wheezing.    Cardiovascular:  Negative for chest pain, palpitations and leg swelling.   Gastrointestinal:  Negative for abdominal pain, constipation, diarrhea, nausea and vomiting.   Endocrine: Negative.    Genitourinary:  Negative for difficulty urinating and frequency.   Musculoskeletal:  Negative for arthralgias and back pain.   Skin: Negative.    Neurological:  Negative for dizziness, seizures, numbness and headaches.   Psychiatric/Behavioral: Negative.            EXAM:   /86 (BP Location: Left arm, Patient Position: Sitting, Cuff Size: adult)   Pulse 67   Temp 97.5 °F (36.4 °C) (Temporal)   Ht 6' (1.829 m)   Wt 256 lb (116.1 kg)   SpO2 94%   BMI 34.72 kg/m²     Physical Exam  Vitals reviewed.   Constitutional:       General: He is not in acute distress.     Appearance: Normal appearance. He is obese. He is not ill-appearing, toxic-appearing or diaphoretic.   HENT:      Head: Normocephalic and  atraumatic.      Right Ear: Tympanic membrane, ear canal and external ear normal. There is no impacted cerumen.      Left Ear: Tympanic membrane, ear canal and external ear normal. There is no impacted cerumen.      Nose: No congestion or rhinorrhea.      Mouth/Throat:      Mouth: Mucous membranes are moist.      Pharynx: Oropharynx is clear. No oropharyngeal exudate or posterior oropharyngeal erythema.   Eyes:      General: No scleral icterus.        Right eye: No discharge.         Left eye: No discharge.      Extraocular Movements: Extraocular movements intact.      Conjunctiva/sclera: Conjunctivae normal.      Pupils: Pupils are equal, round, and reactive to light.   Neck:      Thyroid: No thyroid mass or thyromegaly.   Cardiovascular:      Rate and Rhythm: Normal rate and regular rhythm.      Pulses: Normal pulses.      Heart sounds: Normal heart sounds.   Pulmonary:      Effort: Pulmonary effort is normal. No respiratory distress.      Breath sounds: Normal breath sounds. No wheezing, rhonchi or rales.   Chest:      Chest wall: No tenderness.   Musculoskeletal:      Cervical back: Normal range of motion and neck supple.   Lymphadenopathy:      Cervical: No cervical adenopathy.      Upper Body:      Right upper body: No supraclavicular adenopathy.      Left upper body: No supraclavicular adenopathy.   Skin:     General: Skin is warm.      Coloration: Skin is not jaundiced.   Neurological:      General: No focal deficit present.      Mental Status: He is alert and oriented to person, place, and time.   Psychiatric:         Mood and Affect: Mood normal.         Judgment: Judgment normal.            ASSESSMENT AND PLAN:   Assessment & Plan     Exacerbation of intermittent asthma  -Refilled albuterol, reviewed use, advised to check expiration dates and keep on hand at all times.   -Start Zpak, steroid burst, benzonatate capsules. Reviewed dose, s/e.  -Nasal saline 2 sprays in each nostril every 3-4 hours as needed.    Tylenol 500 mg every 6-8 hours as needed (max dose 3000 mg/day).   Hydrate, humidifier, rest, honey/elderberry  -Reviewed alarm signs/when to go to ER.     Schedule appt with hematology.     The patient indicates understanding of these issues and agrees to the plan.  The patient is asked to return in PRN/ 1-2 weeks with PCP for annual physical.     The above note was creating using Dragon speech recognition technology. Please excuse any typos.       [1]   Current Outpatient Medications   Medication Sig Dispense Refill    azithromycin (ZITHROMAX Z-JENNIFER) 250 MG Oral Tab Take 2 tablets (500 mg total) by mouth daily for 1 day, THEN 1 tablet (250 mg total) daily for 4 days. 6 tablet 0    predniSONE 20 MG Oral Tab Take 1 tablet (20 mg total) by mouth 2 (two) times daily for 5 days. 10 tablet 0    benzonatate 200 MG Oral Cap Take 1 capsule (200 mg total) by mouth 3 (three) times daily as needed for cough. 30 capsule 0    albuterol (PROAIR HFA) 108 (90 Base) MCG/ACT Inhalation Aero Soln Inhale 2 puffs into the lungs every 4 (four) hours as needed for Wheezing. 1 each 2    apixaban (ELIQUIS) 5 MG Oral Tab Take 10 mg bid for 7  days , then 5 mg bid 90 tablet 0    benzonatate (TESSALON PERLES) 100 MG Oral Cap Take 1 capsule (100 mg total) by mouth 3 (three) times daily as needed for cough. 30 capsule 0    pseudoephedrine 30 MG Oral Tab Take 1 tablet (30 mg total) by mouth in the morning, at noon, and at bedtime. 36 tablet 0    Bisoprolol Fumarate 10 MG Oral Tab Take 1 tablet (10 mg total) by mouth every morning. 90 tablet 3    fexofenadine 180 MG Oral Tab Take 1 tablet (180 mg total) by mouth as needed. (Patient not taking: Reported on 5/8/2025) 90 tablet 0    Doxycycline Hyclate 100 MG Oral Tab Take 1 tablet (100 mg total) by mouth 2 (two) times daily. 14 tablet 0    methylPREDNISolone (MEDROL) 4 MG Oral Tablet Therapy Pack Dosepack: take as directed 1 each 0    PEG 3350-KCl-Na Bicarb-NaCl (TRILYTE) 420 g Oral Recon Soln  Take prep as directed by gastro office. May substitute with Trilyte/generic equivalent if needed. (Patient not taking: Reported on 5/8/2025) 4000 mL 0    saccharomyces boulardii (FLORASTOR) 250 MG Oral Cap Take 1 capsule (250 mg total) by mouth 2 (two) times daily. 20 capsule 0    Omeprazole 40 MG Oral Capsule Delayed Release Take 1 capsule (40 mg total) by mouth daily. Before a meal (Patient not taking: Reported on 5/8/2025) 30 capsule 2    triamterene-hydroCHLOROthiazide (DYAZIDE) 37.5-25 MG Oral Cap Take 1 capsule by mouth every morning. 90 capsule 1    clotrimazole-betamethasone 1-0.05 % External Cream Apply cream on affected area for for 1  Week   Than as needed 60 g 0   [2]   Past Medical History:   ADD (attention deficit disorder)    Allergic rhinitis    Anxiety    college when i popped my knee out and didnt know if i would heal or what would happen to me    Arthritis    knee and shoulder sometimes and neck after i hit it at work    Asthma (HCC)    since i was little    Benign skin lesion of forehead    Deep vein thrombosis (HCC)    Depression    post divorce and other times    Esophageal reflux    High blood pressure    History of blood clots    2013 DVT Left leg    History of COVID-19    Hyperlipidemia    [facility] blood test showed    Sleep apnea    maybe i dont know for sure    Unspecified essential hypertension   [3]   Social History  Socioeconomic History    Marital status:    Tobacco Use    Smoking status: Never     Passive exposure: Never    Smokeless tobacco: Never   Vaping Use    Vaping status: Never Used   Substance and Sexual Activity    Alcohol use: Yes     Alcohol/week: 0.0 standard drinks of alcohol     Comment: 1-2 beers/week, last alcohol use was 2 weeks ago    Drug use: No   Other Topics Concern    Caffeine Concern Yes     Comment: coffee, tea, 2 cups daily

## 2025-06-09 NOTE — TELEPHONE ENCOUNTER
No refill protocol.     Please review pended prescription with updated instructions, take 5 MG tablet twice daily.    Original sig: Take 10 MG twice a day for 7 days, then 5 MG twice a day.

## 2025-07-10 DIAGNOSIS — B34.9 VIRAL SYNDROME: ICD-10-CM

## 2025-07-15 RX ORDER — APIXABAN 5 MG/1
5 TABLET, FILM COATED ORAL 2 TIMES DAILY
Qty: 180 TABLET | Refills: 3 | Status: SHIPPED | OUTPATIENT
Start: 2025-07-15

## 2025-07-15 RX ORDER — BISOPROLOL FUMARATE 10 MG/1
10 TABLET, FILM COATED ORAL EVERY MORNING
Qty: 90 TABLET | Refills: 3 | Status: SHIPPED | OUTPATIENT
Start: 2025-07-15

## (undated) NOTE — LETTER
[facility]  [address] [address]      September 12, 2018      Dear Rosey Salazar: On behalf of your primary doctor Sultana Chase MD, we have made multiple attempts to reach you by phone over the past several weeks.   Unfortu

## (undated) NOTE — LETTER
03/27/18        [facility] [address] [id number]      Dear Diego Gooden,    [facility] records indicate that you have outstanding lab work and or testing that was ordered for you and has not yet been completed:          Lipid Panel      PSA (Scree

## (undated) NOTE — MR AVS SNAPSHOT
[facility] SPECIALTY [facility] - [place] [address] [place]  [address] [phone]               Thank you for choosing us for your health care visit with Aditya Hinojosa MD.  We are glad to serve you and happy to provide you with this summary o If you've recently had a stay at the Hospital you can access your discharge instructions in [employer] by going to Visits < Admission Summaries.  If you've been to the Emergency Department or your doctor's office, you can view your past visit information in My

## (undated) NOTE — LETTER
1/19/2021          To Whom It May Concern:    Nyla Santos is currently under my medical care and may not return to work at this time. Please excuse Amado Batistaland for 1 days. He may return to work on 01/20/21. Activity is restricted as follows: none.     If

## (undated) NOTE — LETTER
4/28/2022              Stefania Morley        [address] [place] [address]         To whom it may concern,    Stefania Morley can return to work on May 2nd, 2022.       Sincerely,    NAZANIN Baig [facility] [address] [address]  [phone]        Document electronically generated by:  NAZANIN Baig

## (undated) NOTE — LETTER
11/30/2020          To Whom It May Concern:    Julia Weller is currently under my medical care and may not return to work at this time. 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   He may return to work in the office on December 14, 20

## (undated) NOTE — LETTER
8/20/2019              Sultana Chester        [address] [facility] [address]           Patient is under medical care, please allow him to take a day for medical testing.     Sincerely,    MD Darling Sharp [place] , [address], [place]

## (undated) NOTE — LETTER
Patient: Franco Golden   YOB: 1965   Date of Visit: 10/7/2024     Dear Employer,        October 7, 2024    At [facility], we are taking special precautions and doing everything we can to prevent the spread of COVID-19. During this time, we ask for your assistance regarding physician documentation for employees to return to work following a respiratory illness.     The Centers for Disease Control (CDC) recommends the following:    Ensure that sick leave policies are flexible and consistent with public health guidance, and employees are aware of and understand these policies.   Maintain flexible policies that permit employees to stay home to care for a sick family member or to take care of children due to school and  closures.   Employers should not require a COVID-19 test result or a healthcare provider’s note for sick employees to validate their illness, qualify for sick leave or return to work.   Be aware that healthcare provider offices and medical facilities may be extremely busy and not able to provide documentation in a timely manner. Most people with COVID-19 have mild illness, can recover at home without medical care and can follow CDC recommendations to determine when to discontinue home isolation and return to work.      Individuals with COVID-19 symptoms directed to care for themselves at home should:  Isolate for five days. If individuals are asymptomatic or symptoms are resolving (without fever for 24 hours), isolation may be discontinued on day six. Onset of symptoms is considered day zero.   Follow isolation by five days of mask wearing when around others to minimize the risk of infection.     Individuals infected with SARS-CoV-2 who never develop COVID-19 symptoms:    Day of positive test is considered day zero.   Isolate for five days.  Can discontinue isolation on day six.   Follow isolation by five days of wearing a mask when around others to minimize the risk of  infection.    Individuals who are asymptomatic but have been exposed:  For people who are unvaccinated or are more than six months out from their second mRNA dose (or more than two months after the J&J vaccine) and not yet boosted, CDC now recommends quarantine for five days followed by strict, mask use for an additional five days. Alternatively, if a five-day quarantine is not feasible, it is imperative that an exposed person wear a well-fitting mask at all times when around others for 10 days after exposure.   Individuals who have received their booster shot do not need to quarantine following an exposure but should wear a mask for 10 days after the exposure.    Best practice would also include a test on day five after exposure.   If symptoms occur, individuals should immediately quarantine until a negative test confirms symptoms are not attributable to COVID-19.     Please visit the CDC website for further information and details to assist you during this challenging time.     Sincerely,     No att. providers found

## (undated) NOTE — MR AVS SNAPSHOT
[facility] SPECIALTY [facility] - [place] [address] [address]  [id number] [phone]               Thank you for choosing us for your health care visit with Carlito Umana MD.  We are glad to serve you and happy to provide you with this summary weekend appointments for your exam are available. Walk-in patients are welcome for most exams. [facility]/Kristofer Mena  [facility] Parking) (Yellow Parking)  [address]   [address] If you are confident that your benefit plan will not require a referral or authorization, such as [place] Medicaid, please feel free to schedule your appointment immediately.  However, if you are unsure about the requirements for authorization, please wait Instructions and Information about Your Health     None      Allergies as of Mar 27, 2017     Not on File                Today's Vital Signs     BP Pulse Temp Height Weight BMI    138/80 mmHg 72 97.8 °F (36.6 °C) (Oral) 6' (1.829 m) 232 lb (105.235 kg) 31.

## (undated) NOTE — LETTER
1/20/2021              Trae Spicer        [address] [address]         To Whom It May Concern,    Trae Spicer is currently under my medical care. Please excuse Jazzy Sherman from work starting 1/12/21 to 1/20/21.   He may return

## (undated) NOTE — LETTER
12/31/20        Laine Azar  [address] [address]      Dear Akila Doss records indicate that you have outstanding lab work and or testing that was ordered for you and has not yet been completed:  Orders Placed This Encounter

## (undated) NOTE — LETTER
Date & Time: 5/6/2022, 8:12 PM  Patient: Braydon Kim  Encounter Provider(s):    NAZANIN Jules         This certifies that Macrina Oviedo, a patient at an [facility] facility, am leaving the facility voluntarily and against the advice of my physician. I acknowledge that I have been:    1. informed that my physician believes that I need to receive care here;  2. informed that if I leave, I could become sicker or even die; and  3. provided discharge instructions consistent with my current diagnosis. I hereby release my physician, the facility, and its employees from all responsibility for any ill effects which may result from this action. __________________________________  Patient or authorized caregiver signature    __________________________________  RN signature    If no patient or patient representative signature was obtained, sign below to acknowledge that the form was reviewed with the patient and that the patient refused to sign.     __________________________________  RN signature

## (undated) NOTE — LETTER
02/06/19        Nigel Lawrence  [address] [address]      Dear Fredo Gonzalez,    [address] records indicate that you have outstanding lab work and or testing that was ordered for you and has not yet been completed:  Orders Placed This Encounter      Harleen

## (undated) NOTE — MR AVS SNAPSHOT
[address] [place] [id number] [phone]               Thank you for choosing us for your health care visit with Tanya Davidson NP. We are glad to serve you and happy to provide you with this summary of your visit.   P successful treatment, recurrences are common. Good hygiene and home treatments (in the Home care section below) can improve your condition. Causes  Causes of blepharitis may include:  1. Problems with the oil glands in the eyelid (meibomian glands)  2.  [facility] 8. Wash your hands regularly to help prevent dirt and bacteria from coming in contact with your eyelid. Follow-up care  Follow up with your healthcare provider, or as advised.  Your healthcare provider may refer you to an eye specialist (an optometrist or 13. Rinse the lid in cool water and dry with a clean towel. 14. Repeat on your other eye. Date Last Reviewed: 6/6/2015  © 7309-9148 [address]. All rights reserved.  This information is not intende for 1-2 weeks than as needed   Commonly known as:  CLARITIN           mirtazapine 15 MG Tabs   Take 1 tablet (15 mg total) by mouth nightly.    Commonly known as:  REMERON           ofloxacin 0.3 % Soln   INT 10 GTS IN AD D   Commonly known as:  Clotilda Fill Eat plenty of protein, keep the fat content low Sugars:  sodas and sports drinks, candies and desserts   Eat plenty of low-fat dairy products High fat meats and dairy   Choose whole grain products Foods high in sodium   Water is best for hydration Fast andrea

## (undated) NOTE — LETTER
5/8/2025          To Whom It May Concern:    Franco Golden is currently under my medical care and may not return to work at this time.      He may return to work on 5/9/2025.      If you require additional information please contact our office.        Sincerely,    GENEVA BOCANEGRA MD          Document generated by:  GENEVA BOCANEGRA MD

## (undated) NOTE — LETTER
03/30/21        Guido Alberto  [address] [address]      Dear Jose Toussaint records indicate that you have outstanding lab work and or testing that was ordered for you and has not yet been completed:  Orders Placed This Encounter

## (undated) NOTE — LETTER
06/12/19        Perla Ritter  [address] [address]      Dear Nithin Cruz,    [address] [facility] records indicate that you have outstanding lab work and or testing that was ordered for you and has not yet been completed:  Orders Placed This Encounter      Com

## (undated) NOTE — LETTER
12/7/2020          To Whom It May Concern:    Laine Azar is currently under my medical care and may not return to work at this time. He may return to work on 12/8/2020. If you require additional information please contact our office.         Joshua Lanes